# Patient Record
Sex: MALE | Race: WHITE
[De-identification: names, ages, dates, MRNs, and addresses within clinical notes are randomized per-mention and may not be internally consistent; named-entity substitution may affect disease eponyms.]

---

## 2020-07-01 ENCOUNTER — HOSPITAL ENCOUNTER (OUTPATIENT)
Dept: HOSPITAL 72 - CAR | Age: 60
Discharge: HOME | End: 2020-07-01
Payer: MEDICARE

## 2020-07-01 DIAGNOSIS — G45.9: Primary | ICD-10-CM

## 2020-07-01 PROCEDURE — 93306 TTE W/DOPPLER COMPLETE: CPT

## 2020-07-16 ENCOUNTER — HOSPITAL ENCOUNTER (EMERGENCY)
Dept: HOSPITAL 72 - EMR | Age: 60
Discharge: HOME | End: 2020-07-16
Payer: MEDICAID

## 2020-07-16 VITALS — DIASTOLIC BLOOD PRESSURE: 83 MMHG | SYSTOLIC BLOOD PRESSURE: 125 MMHG

## 2020-07-16 VITALS — DIASTOLIC BLOOD PRESSURE: 81 MMHG | SYSTOLIC BLOOD PRESSURE: 122 MMHG

## 2020-07-16 VITALS — BODY MASS INDEX: 31.83 KG/M2 | HEIGHT: 68 IN | WEIGHT: 210 LBS
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
## 2020-07-16 DIAGNOSIS — S52.502A: Primary | ICD-10-CM

## 2020-07-16 DIAGNOSIS — I10: ICD-10-CM

## 2020-07-16 DIAGNOSIS — Z88.5: ICD-10-CM

## 2020-07-16 DIAGNOSIS — Z88.0: ICD-10-CM
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
## 2020-07-16 DIAGNOSIS — S52.612A: ICD-10-CM
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
## 2020-07-16 DIAGNOSIS — Z88.1: ICD-10-CM

## 2020-07-16 DIAGNOSIS — Z86.73: ICD-10-CM

## 2020-07-16 PROCEDURE — 29125 APPL SHORT ARM SPLINT STATIC: CPT

## 2020-07-16 PROCEDURE — 99283 EMERGENCY DEPT VISIT LOW MDM: CPT

## 2020-07-16 NOTE — NUR
ED Nurse Note:pt. came with left wrist injury and pain s/p fall, pt. is 
ambulatory, VSS, x-ray was done

## 2020-07-16 NOTE — EMERGENCY ROOM REPORT
History of Present Illness


General


Chief Complaint:  Upper Extremity Injury


Source:  Patient





Present Illness


HPI


Disclaimer: Please note that this report is being documented using DRAGON 

technology. This can lead to erroneous entry secondary to incorrect 

interpretation by the dictating instrument.





HPI: 59-year-old male history of hypertension and stroke presented with left 

wrist pain.  He sustained a fall yesterday while walking.  During his left 

wrist.  Complains of left wrist pain that is 9 out of 10 worse with movement 

and palpation.  He denies any other injuries.





PMH: Hypertension, stroke


 


PSH: Reviewed


 


 Social Hx: Patient denies smoking drinking or illicit drug use


Allergies:  


Coded Allergies:  


     AMOXICILLIN (Verified  Allergy, Unknown, 4/4/09)


     PENICILLINS (Verified  Allergy, Rash, 9/30/12)


     MEPERIDINE (Verified  Adverse Reaction, Mild, NAUSEA, 5/4/11)


     MORPHINE (Verified  Adverse Reaction, Mild, NAUSEA, 5/4/11)





COVID-19 Screening


Contact w/high risk pt:  No


Recent Travel to affected area:  No


Experienced COVID-19 symptoms?:  No


COVID-19 Testing performed PTA:  Yes


COVID-19 Screening:  Negative COVID-19


COVID-19 Testing Source:  oropharynx





Patient History


Reviewed Nursing Documentation:  PMH: Agreed; PSxH: Agreed





Nursing Documentation-PMH


Past Medical History:  No History, Except For


Hx Hypertension:  Yes


Hx Cancer:  No


Hx Gastrointestinal Problems:  Yes - colltis


Hx Neurological Problems:  Yes


Hx Cerebrovascular Accident:  Yes - MILD STROKE X 2 YEAR 2009





Review of Systems


All Other Systems:  negative except mentioned in HPI





Physical Exam





Vital Signs








  Date Time  Temp Pulse Resp B/P (MAP) Pulse Ox O2 Delivery O2 Flow Rate FiO2


 


7/16/20 16:30 99.9 114 20 125/83 (97) 96 Room Air  








Sp02 EP Interpretation:  reviewed, normal


General Appearance:  well appearing, no apparent distress


Head:  normocephalic, atraumatic


Eyes:  bilateral eye PERRL, bilateral eye EOMI


ENT:  hearing grossly normal, moist mucus membranes


Neck:  full range of motion, supple


Respiratory:  lungs clear, normal breath sounds, no rhonchi, no respiratory 

distress, no retraction, no wheezing


Cardiovascular #1:  normal peripheral pulses, regular rate, rhythm, no murmur


Gastrointestinal:  non tender, soft, non-distended, no guarding


Musculoskeletal:  other - Left wrist tender to palpation swelling noted, no 

obvious deformity but bruising noted as well.  Distal sensation and pulses 

intact.


Neurologic:  alert, oriented x3, no focal defects


Skin:  normal color, warm/dry





Medical Decision Making


ER Course


MDM: Differential diagnosis included but not limited to left wrist sprain, 

contusion, fracture to name a few.





Clinical course-x-ray ordered of the left wrist and showed distal radius 

fracture, comminuted, patient placed in a splint.  Will be discharged home with 

orthopedic follow-up, primary care follow-up, and return precautions.


Other X-Ray Diagnostic Results


Other X-Ray Diagnostic Results :  


   # of Views/Limited Vs Complete:  3 View


   Indication:  Pain


   EP Interpretation:  Yes


   Interpretation:  other - Comminuted distal radius fracture


   Electronically Signed by:  Rory Pérez MD





Last Vital Signs








  Date Time  Temp Pulse Resp B/P (MAP) Pulse Ox O2 Delivery O2 Flow Rate FiO2


 


7/16/20 16:30 99.9 114 20 125/83 (97) 96 Room Air  








Status:  improved


Disposition:  HOME, SELF-CARE


Condition:  Stable


Scripts


Ibuprofen* (MOTRIN*) 600 Mg Tablet


600 MG ORAL Q6H PRN for For Pain, #30 TAB 0 Refills


   Prov: Rory Pérez M.D.         7/16/20











Rory Pérez M.D. Jul 16, 2020 17:25

## 2020-07-17 NOTE — DIAGNOSTIC IMAGING REPORT
Indication: Wrist pain status post injury

 

Technique: 3 views of the left wrist

 

Comparison: None

 

Findings: There is an acute, impacted and comminuted fracture of the distal radius.

Fracture lines involve the articular surface. Additionally there is an acute fracture

of the ulnar styloid. A well corticated ossific density projects dorsal to the carpal

bones on the lateral view which may represent sequela of chronic triquetral fracture.

There is soft tissue swelling. No radiopaque foreign body.

 

IMPRESSION: Acute fractures of the distal radius and ulna as above.

## 2020-08-01 ENCOUNTER — HOSPITAL ENCOUNTER (EMERGENCY)
Dept: HOSPITAL 72 - EMR | Age: 60
Discharge: HOME | End: 2020-08-01
Payer: MEDICARE

## 2020-08-01 VITALS — DIASTOLIC BLOOD PRESSURE: 62 MMHG | SYSTOLIC BLOOD PRESSURE: 112 MMHG

## 2020-08-01 VITALS — HEIGHT: 68 IN | WEIGHT: 205 LBS | BODY MASS INDEX: 31.07 KG/M2

## 2020-08-01 VITALS — DIASTOLIC BLOOD PRESSURE: 65 MMHG | SYSTOLIC BLOOD PRESSURE: 115 MMHG

## 2020-08-01 VITALS — DIASTOLIC BLOOD PRESSURE: 64 MMHG | SYSTOLIC BLOOD PRESSURE: 115 MMHG

## 2020-08-01 DIAGNOSIS — Z86.73: ICD-10-CM

## 2020-08-01 DIAGNOSIS — Z88.6: ICD-10-CM
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
## 2020-08-01 DIAGNOSIS — S99.911A: ICD-10-CM

## 2020-08-01 DIAGNOSIS — Z88.0: ICD-10-CM

## 2020-08-01 DIAGNOSIS — S99.921A: Primary | ICD-10-CM

## 2020-08-01 DIAGNOSIS — I10: ICD-10-CM

## 2020-08-01 PROCEDURE — 29515 APPLICATION SHORT LEG SPLINT: CPT

## 2020-08-01 PROCEDURE — 99284 EMERGENCY DEPT VISIT MOD MDM: CPT

## 2020-08-01 NOTE — DIAGNOSTIC IMAGING REPORT
EXAM:

  XR Right Ankle, 3 Views

 

CLINICAL HISTORY:

  PAIN

 

TECHNIQUE:

  Frontal, lateral and oblique views of the right ankle.

 

COMPARISON:

  Right foot x-rays obtained the same date.

 

FINDINGS:

  Bones/joints:  Unremarkable.  No visible displaced ankle fracture or 

dislocation.  Ankle mortise and talar dome appear unremarkable.  

Visualized joint spaces appear unremarkable.

  Soft tissues:  Mild soft tissue swelling overlying the lateral 

malleolus and anterior ankle.  No radiodense foreign bodies.  No soft 

tissue gas lucencies.

 

IMPRESSION:     

  Mild soft tissue swelling overlying the lateral malleolus and anterior 

ankle.

## 2020-08-01 NOTE — EMERGENCY ROOM REPORT
History of Present Illness


General


Chief Complaint:  Syncope


Source:  Patient, Medical Record





Present Illness


HPI


59-year-old male presents complaining of right foot and ankle pain.  States 

that on 7/16 he had a mechanical fall where he did in fact break his left 

forearm.  States that from that same fall he may have injured his right foot 

and ankle but did not seek medical attention at that time.  Pain is throbbing, 

8 out of 10, nonradiating.  Is able to bear weight.  No other aggravating 

relieving factors.  Denies any other associated symptoms


Allergies:  


Coded Allergies:  


     AMOXICILLIN (Verified  Allergy, Unknown, 4/4/09)


     PENICILLINS (Verified  Allergy, Rash, 9/30/12)


     MEPERIDINE (Verified  Adverse Reaction, Mild, NAUSEA, 5/4/11)


     MORPHINE (Verified  Adverse Reaction, Mild, NAUSEA, 5/4/11)





COVID-19 Screening


Contact w/high risk pt:  No


Recent Travel to affected area:  No


Experienced COVID-19 symptoms?:  No


COVID-19 Testing performed PTA:  Yes


COVID-19 Screening:  Negative COVID-19


COVID-19 Testing Source:  unknown





Patient History


Past Medical History:  HTN, CVA/TIA, other - colitis


Pertinent Family History:  none


Social History:  Denies: smoking, alcohol use, drug use


Immunizations:  UTD


Reviewed Nursing Documentation:  PMH: Agreed; PSxH: Agreed





Nursing Documentation-PMH


Past Medical History:  No History, Except For


Hx Hypertension:  Yes


Hx Cancer:  No


Hx Gastrointestinal Problems:  Yes - colltis


Hx Neurological Problems:  Yes


Hx Cerebrovascular Accident:  Yes - MILD STROKE X 2 YEAR 2009





Review of Systems


All Other Systems:  negative except mentioned in HPI





Physical Exam





Vital Signs








  Date Time  Temp Pulse Resp B/P (MAP) Pulse Ox O2 Delivery O2 Flow Rate FiO2


 


8/1/20 08:40 96.6 120 18 112/62 (79) 98 Room Air  








Sp02 EP Interpretation:  reviewed, normal


General Appearance:  no apparent distress, alert, GCS 15, non-toxic


Head:  normocephalic


Eyes:  bilateral eye normal inspection, bilateral eye PERRL


ENT:  normal ENT inspection


Neck:  normal inspection


Respiratory:  normal inspection


Cardiovascular #1:  normal inspection


Gastrointestinal:  normal inspection


Rectal:  deferred


Genitourinary:  no CVA tenderness


Musculoskeletal:  tender - R foot dorsum


Neurologic:  alert, motor strength/tone normal, oriented x3, sensory intact, 

responsive, speech normal


Psychiatric:  normal inspection


Skin:  no rash


Lymphatic:  normal inspection





Procedures


Splinting


Splinting :  


   Consent:  Verbal


   Pre-Made Type:  cast shoe/ace wrap


   Pre-Proc Neuro Vasc Exam:  normal


   Post-Proc Neuro Vasc Exam:  normal


   Patient Tolerated:  Well


   Complications:  None





Medical Decision Making


Diagnostic Impression:  


 Primary Impression:  


 Foot injury


 Qualified Codes:  S99.921A - Unspecified injury of right foot, initial 

encounter


 Additional Impression:  


 Ankle injury


 Qualified Codes:  S99.911A - Unspecified injury of right ankle, initial 

encounter


ER Course


Hospital Course 


60 yo M presents c/o R foot and ankle pain from fall on 7/16





Differential diagnoses include: Fracture, dislocation, sprain, contusion





Clinical course


Patient placed on stretcher.  After initial history and physical, I ordered 

Xrays of R foot/ankle 





Xrays read shows no acute fracture/dislocation.  placed in ace wrap, cast shoe





I discussed findings with patient.  Safe for discharge with close outpatient 

follow-up.  I will provide Ortho referral





Diagnosis - foot injury, ankle injury





Stable and discharged to home.  apply ice, keep elevated.  weight bear as 

tolerated.  Followup with PMD/ortho.  Return to ED if symptoms recur or worsen


Other X-Ray Diagnostic Results


Other X-Ray Diagnostic Results #1:  


   X-Ray ordered:  R foot


   # of Views/Limited Vs Complete:  3 View


   Indication:  Pain


   EP Interpretation:  Yes


   Interpretation:  no dislocation, no soft tissue swelling, no fractures


   Impression:  No acute disease


   Electronically Signed by:  Electronically signed by Dawson Leong MD


Other X-Ray Diagnostic Results #2:  


   X-Ray ordered:  R ankle


   # of Views/Limited Vs Complete:  3 View


   Indication:  Pain


   EP Interpretation:  Yes


   Interpretation:  no dislocation, no soft tissue swelling, no fractures


   Impression:  No acute disease


   Electronically Signed by:  Electronically signed by Dawson Leong MD





Last Vital Signs








  Date Time  Temp Pulse Resp B/P (MAP) Pulse Ox O2 Delivery O2 Flow Rate FiO2


 


8/1/20 09:00 96.6 78 18 112/62 98 Room Air  








Status:  improved


Disposition:  HOME, SELF-CARE


Condition:  Stable


Referrals:  


NOT CHOSEN [facility]/MD,REFERRING (PCP)











[facility]





[facility]  **Open 24 hour /7 days a week by Appointment Only**





[address] [address]


Patient Instructions:  Contusion, Easy-to-Read











Dawson Leong MD Aug 1, 2020 12:03

## 2020-08-01 NOTE — NUR
ED Nurse Note:



Patient walked into ED with walker, advised to come in by his PC Dr. Camacho after 
a fall 3 days ago and injuring his right knee and ankle. Patient states he fell 
after turning his head, denies complete LOC. Patient  AxO x 4, no s/s of acute 
distress. Patient placed on the cardiac monitor.

## 2020-08-01 NOTE — DIAGNOSTIC IMAGING REPORT
EXAM:

  XR right foot, 3 views

 

CLINICAL HISTORY:

  PAIN

 

TECHNIQUE:

  Frontal, lateral, and oblique views of the right foot.

 

COMPARISON:

  Right ankle x-rays obtained the same date

 

FINDINGS:

  Bones/joints:  2 separate tiny ossific bodies lateral to the base of 

fifth metatarsal, largest measuring 4 mm.  Otherwise no evidence of 

fracture or dislocation in the foot.  Joint spaces appear within normal 

limits.

  Soft tissues:  Soft tissue swelling overlying the forefoot.  No 

radiodense foreign bodies.  No soft tissue gas lucencies.

 

IMPRESSION:     

1.  Soft tissue swelling overlying the forefoot.

2.  Two separate tiny ossific bodies lateral to the base of fifth 

metatarsal, largest measuring 4 mm.  These are likely small accessory 

ossicles or possibly sequelae of remote trauma.  Recommend correlation 

with point tenderness along the lateral mid foot/forefoot to exclude 

subtle avulsion injury.

## 2020-08-13 ENCOUNTER — HOSPITAL ENCOUNTER (INPATIENT)
Dept: HOSPITAL 72 - EMR | Age: 60
LOS: 5 days | Discharge: NURSING, REHAB OR LONG-TERM CARE | DRG: 91 | End: 2020-08-18
Payer: MEDICARE

## 2020-08-13 VITALS — SYSTOLIC BLOOD PRESSURE: 116 MMHG | DIASTOLIC BLOOD PRESSURE: 76 MMHG

## 2020-08-13 VITALS — BODY MASS INDEX: 28.63 KG/M2 | HEIGHT: 70 IN | WEIGHT: 200 LBS

## 2020-08-13 VITALS — SYSTOLIC BLOOD PRESSURE: 118 MMHG | DIASTOLIC BLOOD PRESSURE: 88 MMHG

## 2020-08-13 VITALS — DIASTOLIC BLOOD PRESSURE: 78 MMHG | SYSTOLIC BLOOD PRESSURE: 117 MMHG

## 2020-08-13 DIAGNOSIS — Z88.1: ICD-10-CM

## 2020-08-13 DIAGNOSIS — E53.8: ICD-10-CM

## 2020-08-13 DIAGNOSIS — Z86.73: ICD-10-CM
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
## 2020-08-13 DIAGNOSIS — G93.89: ICD-10-CM

## 2020-08-13 DIAGNOSIS — F41.9: ICD-10-CM

## 2020-08-13 DIAGNOSIS — F10.11: ICD-10-CM

## 2020-08-13 DIAGNOSIS — G92: ICD-10-CM

## 2020-08-13 DIAGNOSIS — F15.11: ICD-10-CM

## 2020-08-13 DIAGNOSIS — Z98.1: ICD-10-CM

## 2020-08-13 DIAGNOSIS — R27.0: Primary | ICD-10-CM

## 2020-08-13 DIAGNOSIS — Z88.0: ICD-10-CM

## 2020-08-13 DIAGNOSIS — Z88.8: ICD-10-CM

## 2020-08-13 DIAGNOSIS — S09.90XA: ICD-10-CM

## 2020-08-13 DIAGNOSIS — R29.6: ICD-10-CM

## 2020-08-13 DIAGNOSIS — D80.3: ICD-10-CM

## 2020-08-13 LAB
ADD MANUAL DIFF: NO
ALBUMIN SERPL-MCNC: 3.3 G/DL (ref 3.4–5)
ALBUMIN/GLOB SERPL: 0.7 {RATIO} (ref 1–2.7)
ALP SERPL-CCNC: 140 U/L (ref 46–116)
ALT SERPL-CCNC: 11 U/L (ref 12–78)
ANION GAP SERPL CALC-SCNC: 5 MMOL/L (ref 5–15)
APPEARANCE UR: CLEAR
APTT BLD: 35 SEC (ref 23–33)
APTT PPP: YELLOW S
AST SERPL-CCNC: 17 U/L (ref 15–37)
BASOPHILS NFR BLD AUTO: 0.8 % (ref 0–2)
BILIRUB SERPL-MCNC: 0.3 MG/DL (ref 0.2–1)
BUN SERPL-MCNC: 16 MG/DL (ref 7–18)
CALCIUM SERPL-MCNC: 9 MG/DL (ref 8.5–10.1)
CHLORIDE SERPL-SCNC: 99 MMOL/L (ref 98–107)
CO2 SERPL-SCNC: 31 MMOL/L (ref 21–32)
CREAT SERPL-MCNC: 1.2 MG/DL (ref 0.55–1.3)
EOSINOPHIL NFR BLD AUTO: 2.5 % (ref 0–3)
ERYTHROCYTE [DISTWIDTH] IN BLOOD BY AUTOMATED COUNT: 12.2 % (ref 11.6–14.8)
GLOBULIN SER-MCNC: 4.8 G/DL
GLUCOSE UR STRIP-MCNC: NEGATIVE MG/DL
HCT VFR BLD CALC: 35.1 % (ref 42–52)
HGB BLD-MCNC: 12.3 G/DL (ref 14.2–18)
INR PPP: 1.1 (ref 0.9–1.1)
KETONES UR QL STRIP: NEGATIVE
LEUKOCYTE ESTERASE UR QL STRIP: (no result)
LYMPHOCYTES NFR BLD AUTO: 24.5 % (ref 20–45)
MCV RBC AUTO: 91 FL (ref 80–99)
MONOCYTES NFR BLD AUTO: 6.6 % (ref 1–10)
NEUTROPHILS NFR BLD AUTO: 65.7 % (ref 45–75)
NITRITE UR QL STRIP: NEGATIVE
PH UR STRIP: 7 [PH] (ref 4.5–8)
PLATELET # BLD: 142 K/UL (ref 150–450)
POTASSIUM SERPL-SCNC: 4.1 MMOL/L (ref 3.5–5.1)
PROT UR QL STRIP: NEGATIVE
RBC # BLD AUTO: 3.87 M/UL (ref 4.7–6.1)
SODIUM SERPL-SCNC: 135 MMOL/L (ref 136–145)
SP GR UR STRIP: 1.01 (ref 1–1.03)
UROBILINOGEN UR-MCNC: 8 MG/DL (ref 0–1)
WBC # BLD AUTO: 5.4 K/UL (ref 4.8–10.8)

## 2020-08-13 PROCEDURE — 84443 ASSAY THYROID STIM HORMONE: CPT

## 2020-08-13 PROCEDURE — 80048 BASIC METABOLIC PNL TOTAL CA: CPT

## 2020-08-13 PROCEDURE — 36415 COLL VENOUS BLD VENIPUNCTURE: CPT

## 2020-08-13 PROCEDURE — 70551 MRI BRAIN STEM W/O DYE: CPT

## 2020-08-13 PROCEDURE — 80053 COMPREHEN METABOLIC PANEL: CPT

## 2020-08-13 PROCEDURE — 95819 EEG AWAKE AND ASLEEP: CPT

## 2020-08-13 PROCEDURE — 85610 PROTHROMBIN TIME: CPT

## 2020-08-13 PROCEDURE — 82803 BLOOD GASES ANY COMBINATION: CPT

## 2020-08-13 PROCEDURE — 82306 VITAMIN D 25 HYDROXY: CPT

## 2020-08-13 PROCEDURE — 84484 ASSAY OF TROPONIN QUANT: CPT

## 2020-08-13 PROCEDURE — 85730 THROMBOPLASTIN TIME PARTIAL: CPT

## 2020-08-13 PROCEDURE — 70450 CT HEAD/BRAIN W/O DYE: CPT

## 2020-08-13 PROCEDURE — 36600 WITHDRAWAL OF ARTERIAL BLOOD: CPT

## 2020-08-13 PROCEDURE — 82746 ASSAY OF FOLIC ACID SERUM: CPT

## 2020-08-13 PROCEDURE — 93005 ELECTROCARDIOGRAM TRACING: CPT

## 2020-08-13 PROCEDURE — 82607 VITAMIN B-12: CPT

## 2020-08-13 PROCEDURE — 86592 SYPHILIS TEST NON-TREP QUAL: CPT

## 2020-08-13 PROCEDURE — 80307 DRUG TEST PRSMV CHEM ANLYZR: CPT

## 2020-08-13 PROCEDURE — 96375 TX/PRO/DX INJ NEW DRUG ADDON: CPT

## 2020-08-13 PROCEDURE — 81003 URINALYSIS AUTO W/O SCOPE: CPT

## 2020-08-13 PROCEDURE — 99285 EMERGENCY DEPT VISIT HI MDM: CPT

## 2020-08-13 PROCEDURE — 85025 COMPLETE CBC W/AUTO DIFF WBC: CPT

## 2020-08-13 PROCEDURE — 96365 THER/PROPH/DIAG IV INF INIT: CPT

## 2020-08-13 PROCEDURE — 82140 ASSAY OF AMMONIA: CPT

## 2020-08-13 PROCEDURE — 82962 GLUCOSE BLOOD TEST: CPT

## 2020-08-13 PROCEDURE — 73521 X-RAY EXAM HIPS BI 2 VIEWS: CPT

## 2020-08-13 PROCEDURE — 72141 MRI NECK SPINE W/O DYE: CPT

## 2020-08-13 RX ADMIN — OXYCODONE HYDROCHLORIDE SCH MG: 20 TABLET, FILM COATED, EXTENDED RELEASE ORAL at 21:35

## 2020-08-13 RX ADMIN — DOCUSATE SODIUM SCH MG: 100 CAPSULE, LIQUID FILLED ORAL at 21:35

## 2020-08-13 RX ADMIN — HEPARIN SODIUM SCH UNITS: 5000 INJECTION INTRAVENOUS; SUBCUTANEOUS at 21:36

## 2020-08-13 RX ADMIN — LORAZEPAM SCH MG: 1 TABLET ORAL at 21:35

## 2020-08-13 NOTE — DIAGNOSTIC IMAGING REPORT
Indication: Head trauma, status post fall, headaches

 

Technique: sagittal T1 fast spin echo, axial T1 FLAIR, axial T2 FLAIR, axial T2 FS

PROPELLER, axial T2* GRE, axial diffusion weighted images. ADC and exponential ADC

maps generated

 

Comparison: Head CT dated 8/3/2020, brain MRI dated 6/26/2020 and 2/5/2018

 

Findings: There is an area of encephalomalacia in the right parasagittal frontal

lobe. This is also evident on the previous exams. Asymmetric widening of the left

sylvian fissure may indicate old left temporal encephalomalacia, also described

previously.  No abnormal areas of restricted diffusion to suggest acute infarction.

No acute hemorrhage or edema. No mass effect nor midline shift. There is mild

age-related enlargement of the ventricles and extra axial CSF spaces. Old lacunar

infarct is seen in the right cerebellum. There is right maxillary sinus mucosal

disease. Visualized orbits are unremarkable.. Visualized orbits and sinuses are

unremarkable.

 

Impression: Chronic and age-related changes, including old infarcts

 

Negative for acute intracranial bleed or mass effect

## 2020-08-13 NOTE — DIAGNOSTIC IMAGING REPORT
Indication: Weakness and neck pain 

 

Technique: Sagittal T1 FLAIR PROPELLER, sagittal T2 PROPELLOR, sagittal STIR, axial

T2 PROPELLER, axial 3D COSMIC ASPIR images were obtained through the cervical spine

 

Comparison: 8/7/2017

 

Findings: Exam is limited due to motion artifact. Bony alignment is normal. Vertebral

marrow signal is normal. Again demonstrated is cervical fusion hardware bridging

C3-C7. This throws off some susceptibility artifact which may obscure pathology.

Intrinsic cord signal is normal. Vertebral body heights are preserved. No

prevertebral soft tissue swelling.

 

At C2-3, there is mild broad-based posterior disc protrusion which results in

borderline narrowing of the spinal canal. There is probably mild narrowing of the

left neural foramen at this level, although this is not well visualized due to the

motion artifact.

 

At C3-4, minimal posterior disc bulge/osteophyte complex is noted, does not appear to

significantly compromise the spinal canal. There may be mild neural foraminal

narrowing on the left.

 

At C4-5, there is minimal right paracentral disc protrusion which may slightly

compromise the spinal canal. The neural foramina are preserved.

 

At C5-6, no significant disc bulge or protrusion, spinal stenosis, or neural

foraminal narrowing is demonstrated.

 

At C6-7, there is questionable right lateral osteophyte complex which could slightly

compromise right lateral recess. No definite central canal stenosis. There is

questionable mild narrowing of the left neural foramen.

 

At C7-T1, no significant disc bulge or protrusion, spinal stenosis, or neural

foraminal stenosis..

 

Included extraspinal soft tissues are unremarkable.

 

Impression: Limited exam, due to motion artifact. This limits assessment especially

of the spinal canal and the neural foramina

 

Exam also somewhat limited due to susceptibility artifact from hardware

 

Mild degenerative changes, as detailed on a level by level basis above.

## 2020-08-13 NOTE — EMERGENCY ROOM REPORT
History of Present Illness


General


Chief Complaint:  Generalized Weakness


Source:  Patient, EMS





Present Illness


HPI


Patient is a 59-year-old male who presents after increased headache and 

generalized weakness.  Reports having multiple falls recently.  Had prior 

history of recent fall with head injury.  Previous history of cervical surgery 

for myelitis.  Reports having increased difficulty with ambulation and more 

frequent falling.  Reports having fallen this morning.  Struck the back of his 

head.  Denies prior history of seizure disorder.  States he is followed by Dr. Sp Hess.Patient reports having increased pain to his head and neck.  Had 

recent CT imaging which did not show any evidence of acute intracranial 

hemorrhage.


Allergies:  


Coded Allergies:  


     AMOXICILLIN (Verified  Allergy, Unknown, 4/4/09)


     PENICILLINS (Verified  Allergy, Unknown, Rash, 8/13/20)


     MEPERIDINE (Verified  Adverse Reaction, Mild, NAUSEA, 5/4/11)


     MORPHINE (Verified  Adverse Reaction, Mild, NAUSEA, 5/4/11)


Uncoded Allergies:  


     PENICILIN (Allergy, Unknown, 8/13/20)





COVID-19 Screening


Contact w/high risk pt:  No


Recent Travel to affected area:  No


Experienced COVID-19 symptoms?:  No


COVID-19 Testing performed PTA:  No





Patient History


Past Medical History:  see triage record


Past Surgical History:  other - C-spine surgery


Reviewed Nursing Documentation:  PMH: Agreed; PSxH: Agreed





Nursing Documentation-PMH


Hx Hypertension:  Yes


Hx Cancer:  No


Hx Gastrointestinal Problems:  Yes - colitis


Hx Neurological Problems:  Yes


Hx Cerebrovascular Accident:  Yes - MILD STROKE X 2 YEAR 2009





Review of Systems


All Other Systems:  negative except mentioned in HPI





Physical Exam





Vital Signs








  Date Time  Temp Pulse Resp B/P (MAP) Pulse Ox O2 Delivery O2 Flow Rate FiO2


 


8/13/20 14:28 98.1 101 16 121/80 (94) 99 Room Air  








General Appearance:  alert, GCS 15, Chronically Ill


Head:  other - Occipital scalp laceration less than 1 cm with no active bleeding


ENT:  hearing grossly normal


Neck:  limited range of motion


Respiratory:  chest non-tender, lungs clear, normal breath sounds


Cardiovascular #1:  normal inspection, no edema


Gastrointestinal:  normal inspection, soft


Musculoskeletal:  normal inspection, back normal


Neurologic:  alert, motor strength/tone normal, CNs III-XII nml as tested





Medical Decision Making


Diagnostic Impression:  


 Primary Impression:  


 Head injury


 Additional Impressions:  


 Weakness


 Cervical spine disease


 Laceration of head


ER Course


Patient presented after recent fall.  Differential diagnosis include was not 

limited to head injury, subdural hematoma, degenerative neurologic condition, 

alcohol withdrawal, among others.  Because of complexity of patient's case 

laboratory tests and imaging studies were ordered.  Patient was noted to have 

some generalized confusion and states he takes normally gabapentin as well as 

other pain medications.  Patient had previous CT imaging approximately 1 week 

ago.  This showed no evidence of acute intracranial pathology.  Patient had 

recurrent head trauma and had previously had MRIs attempted 2 months ago which 

was markedly degraded by metal artifact.Patient staples to the left side of his 

head were removed.  MRI read by radiology showed encephalomalacia to the 

frontal lobe without evident intracranial hemorrhage or mass.  MRI of the 

cervical spine showed, multilevel degenerative changes.  Patient was discussed 

with Dr. Rao who is covering for [employer] patient will be 

admitted to [facility] due to covering physician for Dr. Chuck Hess who is the patient's primary care physician..





Labs








Test


  8/13/20


15:15 8/13/20


16:57


 


White Blood Count


  5.4 K/UL


(4.8-10.8) 


 


 


Red Blood Count


  3.87 M/UL


(4.70-6.10) 


 


 


Hemoglobin


  12.3 G/DL


(14.2-18.0) 


 


 


Hematocrit


  35.1 %


(42.0-52.0) 


 


 


Mean Corpuscular Volume 91 FL (80-99)  


 


Mean Corpuscular Hemoglobin


  31.9 PG


(27.0-31.0) 


 


 


Mean Corpuscular Hemoglobin


Concent 35.2 G/DL


(32.0-36.0) 


 


 


Red Cell Distribution Width


  12.2 %


(11.6-14.8) 


 


 


Platelet Count


  142 K/UL


(150-450) 


 


 


Mean Platelet Volume


  7.6 FL


(6.5-10.1) 


 


 


Neutrophils (%) (Auto)


  65.7 %


(45.0-75.0) 


 


 


Lymphocytes (%) (Auto)


  24.5 %


(20.0-45.0) 


 


 


Monocytes (%) (Auto)


  6.6 %


(1.0-10.0) 


 


 


Eosinophils (%) (Auto)


  2.5 %


(0.0-3.0) 


 


 


Basophils (%) (Auto)


  0.8 %


(0.0-2.0) 


 


 


Sodium Level


  135 MMOL/L


(136-145) 


 


 


Potassium Level


  4.1 MMOL/L


(3.5-5.1) 


 


 


Chloride Level


  99 MMOL/L


() 


 


 


Carbon Dioxide Level


  31 MMOL/L


(21-32) 


 


 


Anion Gap


  5 mmol/L


(5-15) 


 


 


Blood Urea Nitrogen


  16 mg/dL


(7-18) 


 


 


Creatinine


  1.2 MG/DL


(0.55-1.30) 


 


 


Estimat Glomerular Filtration


Rate > 60 mL/min


(>60) 


 


 


Glucose Level


  101 MG/DL


() 


 


 


Calcium Level


  9.0 MG/DL


(8.5-10.1) 


 


 


Total Bilirubin


  0.3 MG/DL


(0.2-1.0) 


 


 


Aspartate Amino Transf


(AST/SGOT) 17 U/L (15-37) 


  


 


 


Alanine Aminotransferase


(ALT/SGPT) 11 U/L (12-78) 


  


 


 


Alkaline Phosphatase


  140 U/L


() 


 


 


Troponin I


  0.000 ng/mL


(0.000-0.056) 


 


 


Total Protein


  8.1 G/DL


(6.4-8.2) 


 


 


Albumin


  3.3 G/DL


(3.4-5.0) 


 


 


Globulin 4.8 g/dL  


 


Albumin/Globulin Ratio 0.7 (1.0-2.7)  


 


Thyroid Stimulating Hormone


(TSH) 2.515 uiU/mL


(0.358-3.740) 


 











Last Vital Signs








  Date Time  Temp Pulse Resp B/P (MAP) Pulse Ox O2 Delivery O2 Flow Rate FiO2


 


8/13/20 14:28 98.1 101 16 121/80 (94) 99 Room Air  








Status:  improved


Disposition:  ADMITTED AS INPATIENT


Condition:  Stable











Toy Leyva MD Aug 13, 2020 14:44

## 2020-08-14 VITALS — DIASTOLIC BLOOD PRESSURE: 78 MMHG | SYSTOLIC BLOOD PRESSURE: 128 MMHG

## 2020-08-14 VITALS — DIASTOLIC BLOOD PRESSURE: 83 MMHG | SYSTOLIC BLOOD PRESSURE: 150 MMHG

## 2020-08-14 VITALS — SYSTOLIC BLOOD PRESSURE: 115 MMHG | DIASTOLIC BLOOD PRESSURE: 69 MMHG

## 2020-08-14 VITALS — DIASTOLIC BLOOD PRESSURE: 81 MMHG | SYSTOLIC BLOOD PRESSURE: 130 MMHG

## 2020-08-14 VITALS — SYSTOLIC BLOOD PRESSURE: 118 MMHG | DIASTOLIC BLOOD PRESSURE: 65 MMHG

## 2020-08-14 VITALS — SYSTOLIC BLOOD PRESSURE: 119 MMHG | DIASTOLIC BLOOD PRESSURE: 77 MMHG

## 2020-08-14 LAB
ADD MANUAL DIFF: NO
ANION GAP SERPL CALC-SCNC: 6 MMOL/L (ref 5–15)
BASOPHILS NFR BLD AUTO: 0.7 % (ref 0–2)
BUN SERPL-MCNC: 17 MG/DL (ref 7–18)
CALCIUM SERPL-MCNC: 9 MG/DL (ref 8.5–10.1)
CHLORIDE SERPL-SCNC: 101 MMOL/L (ref 98–107)
CO2 SERPL-SCNC: 30 MMOL/L (ref 21–32)
CREAT SERPL-MCNC: 1.2 MG/DL (ref 0.55–1.3)
EOSINOPHIL NFR BLD AUTO: 3.2 % (ref 0–3)
ERYTHROCYTE [DISTWIDTH] IN BLOOD BY AUTOMATED COUNT: 11.9 % (ref 11.6–14.8)
HCT VFR BLD CALC: 37.4 % (ref 42–52)
HGB BLD-MCNC: 12.4 G/DL (ref 14.2–18)
LYMPHOCYTES NFR BLD AUTO: 31.9 % (ref 20–45)
MCV RBC AUTO: 93 FL (ref 80–99)
MONOCYTES NFR BLD AUTO: 7.4 % (ref 1–10)
NEUTROPHILS NFR BLD AUTO: 56.7 % (ref 45–75)
PLATELET # BLD: 160 K/UL (ref 150–450)
POTASSIUM SERPL-SCNC: 4 MMOL/L (ref 3.5–5.1)
RBC # BLD AUTO: 4.03 M/UL (ref 4.7–6.1)
SODIUM SERPL-SCNC: 137 MMOL/L (ref 136–145)
WBC # BLD AUTO: 5.2 K/UL (ref 4.8–10.8)

## 2020-08-14 RX ADMIN — LORAZEPAM SCH MG: 1 TABLET ORAL at 21:22

## 2020-08-14 RX ADMIN — Medication SCH EA: at 08:43

## 2020-08-14 RX ADMIN — QUETIAPINE SCH MG: 200 TABLET, FILM COATED ORAL at 08:43

## 2020-08-14 RX ADMIN — OXYCODONE HYDROCHLORIDE SCH MG: 20 TABLET, FILM COATED, EXTENDED RELEASE ORAL at 17:00

## 2020-08-14 RX ADMIN — CYANOCOBALAMIN SCH MCG: 1000 INJECTION, SOLUTION INTRAMUSCULAR at 15:52

## 2020-08-14 RX ADMIN — DOCUSATE SODIUM SCH MG: 100 CAPSULE, LIQUID FILLED ORAL at 08:42

## 2020-08-14 RX ADMIN — DOCUSATE SODIUM SCH MG: 100 CAPSULE, LIQUID FILLED ORAL at 21:22

## 2020-08-14 RX ADMIN — OXYCODONE HYDROCHLORIDE SCH MG: 20 TABLET, FILM COATED, EXTENDED RELEASE ORAL at 23:49

## 2020-08-14 RX ADMIN — OXYCODONE HYDROCHLORIDE SCH MG: 20 TABLET, FILM COATED, EXTENDED RELEASE ORAL at 08:43

## 2020-08-14 RX ADMIN — HEPARIN SODIUM SCH UNITS: 5000 INJECTION INTRAVENOUS; SUBCUTANEOUS at 08:45

## 2020-08-14 RX ADMIN — HEPARIN SODIUM SCH UNITS: 5000 INJECTION INTRAVENOUS; SUBCUTANEOUS at 21:25

## 2020-08-14 NOTE — CONSULTATION
Consult Note


Consult Note


      [facility]


        NEUROLOGY CONSULTATION


              August 14, 2020





Dear Dr. Rao,





I evaluated Mr. Moore and my assesement is as follows.





HISTORY:


Mr. Sven Moore is a 59-year-old, right-handed, 


 gentleman, who was referred to me for 


evaluation and management of alteration in mental 


state, and unsteadiness on his feet with frequent falls.





He does have a past history of a cervical spine injury 


for which he has had surgery, a severe head injury the 


exact details of which he cannot tell me about, anxiety, 


polysubstance abuse, and unsteadiness on his feet.





For the last 3 weeks or so he has been increasingly 


unsteady on his feet, has fallen down on numerous 


occasions, and has been more confused and altered 


with regards to his mental state.





He himself is unable to give me much of a history.





PAST HISTORY:


Cervical spine injury for which he has had surgery, a 


severe head injury the exact details of which he cannot 


tell me about, anxiety, polysubstance abuse, and 


unsteadiness on his feet.





FAMILY HISTORY:


Nothing significant as per the patient.





PERSONAL HISTORY:


Home: He lives at home with his parents.


Work: He is unable to tell me what kind of work he does


right now and what kind of work he did in the past.


Habits: He tells me that he used to drink and use drugs 


in the past but not recently.  He tells me that he does 


not smoke.





ALLERGIES:


He is allergic to amoxicillin meperidine morphine and penicillins.





NEUROLOGIC REVIEW OF SYSTEMS:


Unable to obtain.





PHYSICAL EXAMINATION:


GENERAL: He is a well-developed, relatively well-nourished, 


 gentleman, lying in bed, in no acute distress, 


exhibiting significant waxing and waning of his level of 


alertness throughout the entire interview and examination.


VITAL SIGNS:


Pulse:  98.1/minute and regular.


Blood Pressure: 128/78 mm of Hg.


Respirations: 20/minute


Temperature 98.1 F


HEAD: Normocephalic and atraumatic. 


NECK: No neck rigidity was observed. 


Range of motion was decreased in the neck.


EENT: Benign. 





NEUROLOGIC EXAMINATION:  


MENTAL STATUS EXAMINATION: 


He exhibited significant waxing and waning of his level 


of alertness.  He would be awake and alert for a few 


seconds and then rapidly go back to sleep while being 


talked to.


He was oriented to self only.  He had no idea of where he


was or what the date was.


He was unable to recall 3 words given to him.


He was unable to tell me who the present president is and 


who prior presidents were.


He was unable to cooperate for further mental status testing.


SPEECH: He had a mild dysarthria.


LANGUAGE: He was unable to cooperate for formal language


testing.  He however was able to be comprehend simple 


commands but did have problems with expression.


CRANIAL NERVE EXAMINATION:  


II: He did blink to threat in all fields but had problems with


counting fingers.


III, IV, VI: External ocular movements were full and pupils 


3 mm in diameter equal, round, regular and reactive to light.  


V:  The facial sensations were normal, and the temporales, 


masseters and pterygoids functioned normally. 


VII: He had a mild left greater than right seventh central 


facial paresis.


VIII: The patient was able to hear well bilaterally and had 


no nystagmus. 


IX: The palate moved symmetrically on phonation. 


X:  There was no hoarseness of voice.  


XI:  The sternocleidomastoids and trapezii functioned normally.  


XII:  The tongue was in the midline without any fasciculations 


or atrophy.


MOTOR SYSTEM:  


The tone was increased in both lower extremities with mild 


degree of spasticity.


Examination of muscle mass revealed no focal wasting. 


Examination of power was exceedingly difficult to measure 


because of varying degrees of cooperation.  He did however 


have mild bilateral finger extensor and iliopsoas weakness.


SENSORY EXAMINATION: He responded appropriately to 


painful stimuli.  He was unable to cooperate for the 


sensory modalities.


REFLEXES:  1+ and bilaterally symmetric at the biceps, 


triceps, brachioradialis, and knees. 0 at both ankles.  


The plantar responses were flexor bilaterally.  


COORDINATION, STANCE & GAIT: Could not be tested.


ABNORMAL MOVEMENTS:


Asterixis: G 2/4 in both upper extremities.


Myoclonus: G 1/4





DIAGNOSTIC IMPRESSION:


1. Mr. Sven Moore is a 59-year-old, right-handed, 


 gentleman, who does have a past history of 


a cervical spine injury for which he has had surgery, 


a severe head injury the exact details of which he cannot 


tell me about, anxiety, polysubstance abuse, and 


unsteadiness on his feet.


2. For the last 3 weeks or so he has been increasingly 


unsteady on his feet, has fallen down on numerous 


occasions, and has been more confused and altered 


with regards to his mental state. He himself is unable 


to give me much of a history.


3.  On neurological examination, at this time, he exhibits 


significant waxing and waning of his level of alertness.


He has significant problems with orientation, recent and 


remote memory, and other cognitive function.  He has a 


mild dysarthria, and is unable to cooperate for formal 


language testing.  He has a left greater than right 


seventh central facial paresis.  He also has bilateral finger 


extensor and iliopsoas weakness associated with lower 


extremity spasticity.  His deep tendon reflexes are globally


diminished.  He is unable to stand and walk.  He exhibits 


significant asterixis and minor myoclonus.


4.  The MRI scan of the brain reveals bilateral frontal and 


right temporal encephalomalacia in a traumatic pattern.


5.  The MRI scan of the cervical spine reveals postsurgical 


changes with mild to moderate multilevel disease but no 


cord compression.


6.  Laboratory data obtained thus far have revealed a mild 


anemia with a hemoglobin of 12.4 g, elevated alkaline 


phosphatase 840, low albumin at 3.3, low vitamin B12 


level at 304, low folate at 6 and normal TSH.


7.  The patient's history and neurological examination are 


most consistent with an encephalopathic picture.  There 


is a high probability that this encephalopathy is of a 


metabolic nature.


8.  The frequent falls may be associated with the 


encephalopathy and asterixis.





RECOMMENDATIONS:


1.  Agree with management thus far.


2.  Correct all toxic metabolic imbalances including reversal 


of folate and B12 deficiency.


3.  Complete work-up for encephalopathy with vitamin D level 


ABG and ammonia.


4.  EEG to evaluate the patient for the degree and type of 


encephalopathy.


5.  Observe closely.





Thank you for entrusting me to take care of Mr. Moore's 


Neurologic needs.





I shall follow him with you.





Sincerely, 








__________________________________


Tio Martin M.D., M.S.P.H.


Neurologist & Clinical Neurophysiologist











Tio Martin MD Aug 14, 2020 14:27

## 2020-08-14 NOTE — HISTORY AND PHYSICAL
History of Present Illness


General


Date patient seen:  Aug 14, 2020


Time patient seen:  06:55


Reason for Hospitalization:  Generalized Weakness





Present Illness


HPI


59 year old man, remote history of alcohol abuse, methamphetamine use, CVA, 

previous cervical spine fusion, anxiety who presented to the ED with frequent 

falls, ataxia and bilateral leg weakness. No incontinence, focal weakness, no 

visual deficits. In ED imaging including MRI brain and C-spine were done, no 

acute abnormality found. Brain did show encephalomalacia.


Allergies:  


Coded Allergies:  


     AMOXICILLIN (Verified  Allergy, Unknown, 4/4/09)


     PENICILLINS (Verified  Allergy, Unknown, Rash, 8/13/20)


     MEPERIDINE (Verified  Adverse Reaction, Mild, NAUSEA, 5/4/11)


     MORPHINE (Verified  Adverse Reaction, Mild, NAUSEA, 5/4/11)


Uncoded Allergies:  


     PENICILIN (Allergy, Unknown, 8/13/20)





COVID-19 Screening


Contact w/high risk pt:  No


Recent Travel to affected area:  No


Experienced COVID-19 symptoms?:  No





Medication History


Scheduled


Gabapentin (Neurontin), 300 MG ORAL THREE TIMES A DAY, (Reported)


Lorazepam* (Ativan*), 2 MG ORAL BEDTIME, (Reported)


Multivitamin With Minerals (Multivitamins With Minerals*), 1 TAB ORAL DAILY, (

Reported)


Oxycodone Hcl Er* (Oxycontin*), 80 MG ORAL TID, (Reported)


Quetiapine Fumarate* (Seroquel*), 200 MG ORAL DAILY, (Reported)





Scheduled PRN


Ibuprofen* (Motrin*), 600 MG ORAL Q6H PRN for For Pain





Patient History


Healthcare decision maker





Resuscitation status





Advanced Directive on File








Review of Systems


Constitutional:  Denies: chills, fever


Respiratory:  Denies: cough


Cardiovascular:  Denies: chest pain


Gastrointestinal:  Denies: abdominal pain


Musculoskeletal:  Denies: back pain


Skin:  Denies: rash


Neurological:  Reports: focal weakness; Denies: headache





Physical Exam


General Appearance:  alert


HEENT:  atraumatic, anicteric


Neck:  normal alignment, supple


Respiratory/Chest:  lungs clear, normal breath sounds


Cardiovascular/Chest:  normal rate, regular rhythm


Abdomen:  non tender


Neurologic:  CNs II-XII grossly normal, other - Hyperreflexia





Last 24 Hour Vital Signs








  Date Time  Temp Pulse Resp B/P (MAP) Pulse Ox O2 Delivery O2 Flow Rate FiO2


 


8/14/20 04:00 97.5 76 20 118/65 (82) 95   


 


8/14/20 00:00 97.7 78 18 119/77 (91) 98   


 


8/13/20 22:16 96.1       


 


8/13/20 21:00      Room Air  


 


8/13/20 20:00 96.1 91 22 118/88 (98) 95   


 


8/13/20 19:05      Room Air  


 


8/13/20 18:25 98.2 74 17 124/76 98   


 


8/13/20 17:21 98.1       


 


8/13/20 17:04 98.1 95 17 116/76 96 Room Air  


 


8/13/20 15:00 98.1 98 18 117/78 98 Room Air  


 


8/13/20 15:00  98 18   Room Air  98


 


8/13/20 14:28 98.1 101 16 121/80 (94) 99 Room Air  

















Intake and Output  


 


 8/13/20 8/14/20





 19:00 07:00


 


Intake Total 0 ml 360 ml


 


Balance 0 ml 360 ml


 


  


 


Intake Oral 0 ml 


 


Other  360 ml


 


# Voids  2


 


# Bowel Movements  4











Laboratory Tests








Test


  8/13/20


15:15 8/13/20


16:57 8/13/20


17:41 8/14/20


05:45


 


White Blood Count


  5.4 K/UL


(4.8-10.8) 


  


  5.2 K/UL


(4.8-10.8)


 


Red Blood Count


  3.87 M/UL


(4.70-6.10)  L 


  


  4.03 M/UL


(4.70-6.10)  L


 


Hemoglobin


  12.3 G/DL


(14.2-18.0)  L 


  


  12.4 G/DL


(14.2-18.0)  L


 


Hematocrit


  35.1 %


(42.0-52.0)  L 


  


  37.4 %


(42.0-52.0)  L


 


Mean Corpuscular Volume 91 FL (80-99)     93 FL (80-99)  


 


Mean Corpuscular Hemoglobin


  31.9 PG


(27.0-31.0)  H 


  


  30.8 PG


(27.0-31.0)


 


Mean Corpuscular Hemoglobin


Concent 35.2 G/DL


(32.0-36.0) 


  


  33.1 G/DL


(32.0-36.0)


 


Red Cell Distribution Width


  12.2 %


(11.6-14.8) 


  


  11.9 %


(11.6-14.8)


 


Platelet Count


  142 K/UL


(150-450)  L 


  


  160 K/UL


(150-450)


 


Mean Platelet Volume


  7.6 FL


(6.5-10.1) 


  


  7.8 FL


(6.5-10.1)


 


Neutrophils (%) (Auto)


  65.7 %


(45.0-75.0) 


  


  56.7 %


(45.0-75.0)


 


Lymphocytes (%) (Auto)


  24.5 %


(20.0-45.0) 


  


  31.9 %


(20.0-45.0)


 


Monocytes (%) (Auto)


  6.6 %


(1.0-10.0) 


  


  7.4 %


(1.0-10.0)


 


Eosinophils (%) (Auto)


  2.5 %


(0.0-3.0) 


  


  3.2 %


(0.0-3.0)  H


 


Basophils (%) (Auto)


  0.8 %


(0.0-2.0) 


  


  0.7 %


(0.0-2.0)


 


Sodium Level


  135 MMOL/L


(136-145)  L 


  


  137 MMOL/L


(136-145)


 


Potassium Level


  4.1 MMOL/L


(3.5-5.1) 


  


  4.0 MMOL/L


(3.5-5.1)


 


Chloride Level


  99 MMOL/L


() 


  


  101 MMOL/L


()


 


Carbon Dioxide Level


  31 MMOL/L


(21-32) 


  


  30 MMOL/L


(21-32)


 


Anion Gap


  5 mmol/L


(5-15) 


  


  6 mmol/L


(5-15)


 


Blood Urea Nitrogen


  16 mg/dL


(7-18) 


  


  17 mg/dL


(7-18)


 


Creatinine


  1.2 MG/DL


(0.55-1.30) 


  


  1.2 MG/DL


(0.55-1.30)


 


Estimat Glomerular Filtration


Rate > 60 mL/min


(>60) 


  


  > 60 mL/min


(>60)


 


Glucose Level


  101 MG/DL


() 


  


  96 MG/DL


()


 


Calcium Level


  9.0 MG/DL


(8.5-10.1) 


  


  9.0 MG/DL


(8.5-10.1)


 


Total Bilirubin


  0.3 MG/DL


(0.2-1.0) 


  


  


 


 


Aspartate Amino Transf


(AST/SGOT) 17 U/L (15-37)


  


  


  


 


 


Alanine Aminotransferase


(ALT/SGPT) 11 U/L (12-78)


L 


  


  


 


 


Alkaline Phosphatase


  140 U/L


()  H 


  


  


 


 


Troponin I


  0.000 ng/mL


(0.000-0.056) 


  


  


 


 


Total Protein


  8.1 G/DL


(6.4-8.2) 


  


  


 


 


Albumin


  3.3 G/DL


(3.4-5.0)  L 


  


  


 


 


Globulin 4.8 g/dL     


 


Albumin/Globulin Ratio


  0.7 (1.0-2.7)


L 


  


  


 


 


Thyroid Stimulating Hormone


(TSH) 2.515 uiU/mL


(0.358-3.740) 


  


  


 


 


Prothrombin Time


  


  12.0 SEC


(9.30-11.50)  H 


  


 


 


Prothromb Time International


Ratio 


  1.1 (0.9-1.1)  


  


  


 


 


Activated Partial


Thromboplast Time 


  35 SEC (23-33)


H 


  


 


 


Urine Color   Yellow   


 


Urine Appearance   Clear   


 


Urine pH   7 (4.5-8.0)   


 


Urine Specific Gravity


  


  


  1.010


(1.005-1.035) 


 


 


Urine Protein


  


  


  Negative


(NEGATIVE) 


 


 


Urine Glucose (UA)


  


  


  Negative


(NEGATIVE) 


 


 


Urine Ketones


  


  


  Negative


(NEGATIVE) 


 


 


Urine Blood


  


  


  Negative


(NEGATIVE) 


 


 


Urine Nitrite


  


  


  Negative


(NEGATIVE) 


 


 


Urine Bilirubin


  


  


  Negative


(NEGATIVE) 


 


 


Urine Urobilinogen


  


  


  8 MG/DL


(0.0-1.0)  H 


 


 


Urine Leukocyte Esterase


  


  


  1+ (NEGATIVE)


H 


 


 


Urine RBC


  


  


  0-2 /HPF (0 -


0)  H 


 


 


Urine WBC


  


  


  2-4 /HPF (0 -


0) 


 


 


Urine Squamous Epithelial


Cells 


  


  None /LPF


(NONE/OCC) 


 


 


Urine Bacteria


  


  


  Occasional


/HPF (NONE) 


 


 


Urine Opiates Screen


  


  


  Negative


(NEGATIVE) 


 


 


Urine Barbiturates Screen


  


  


  Negative


(NEGATIVE) 


 


 


Phencyclidine (PCP) Screen


  


  


  Negative


(NEGATIVE) 


 


 


Urine Amphetamines Screen


  


  


  Negative


(NEGATIVE) 


 


 


Urine Benzodiazepines Screen


  


  


  Negative


(NEGATIVE) 


 


 


Urine Cocaine Screen


  


  


  Negative


(NEGATIVE) 


 


 


Urine Marijuana (THC) Screen


  


  


  Negative


(NEGATIVE) 


 


 


Vitamin B12 Level    Pending  


 


Folate    Pending  


 


Rapid Plasma Reagin    Pending  








Height (Feet):  5


Height (Inches):  10.00


Weight (Pounds):  200


Medications





Current Medications








 Medications


  (Trade)  Dose


 Ordered  Sig/Mar


 Route


 PRN Reason  Start Time


 Stop Time Status Last Admin


Dose Admin


 


 Acetaminophen


  (Tylenol)  650 mg  Q4H  PRN


 ORAL


 Mild Pain (Pain Scale 1-3)  8/13/20 17:45


 9/12/20 17:44   


 


 


 Dextrose


  (Dextrose 50%)  25 ml  Q30M  PRN


 IV


 Hypoglycemia  8/13/20 17:45


 11/11/20 17:44   


 


 


 Dextrose


  (Dextrose 50%)  50 ml  Q30M  PRN


 IV


 Hypoglycemia  8/13/20 17:45


 11/11/20 17:44   


 


 


 Diphenhydramine


 HCl


  (Benadryl)  25 mg  Q6H  PRN


 ORAL


 Itching/Pruritis  8/13/20 17:45


 9/12/20 17:44   


 


 


 Docusate Sodium


  (Colace)  100 mg  EVERY 12  HOURS


 ORAL


   8/13/20 21:00


 9/12/20 20:59  8/13/20 21:35


 


 


 Gabapentin


  (Neurontin)  300 mg  THREE TIMES A  DAY


 ORAL


   8/13/20 21:00


 9/12/20 20:59  8/13/20 21:35


 


 


 Heparin Sodium


  (Porcine)


  (Heparin 5000


 units/ml)  5,000 units  EVERY 12  HOURS


 SUBQ


   8/13/20 21:00


 9/27/20 20:59  8/13/20 21:36


 


 


 Ibuprofen


  (Motrin)  600 mg  Q6H  PRN


 ORAL


 For Pain  8/13/20 18:00


 9/12/20 17:59   


 


 


 Lorazepam


  (Ativan)  2 mg  QHS


 ORAL


   8/13/20 21:00


 8/20/20 20:59  8/13/20 21:35


 


 


 Multivitamins


 Therapeutic


  (Therapeutic


 Multivitamin)  1 ea  DAILY


 ORAL


   8/14/20 09:00


 9/13/20 08:59   


 


 


 Ondansetron HCl


  (Zofran)  4 mg  Q6H  PRN


 IVP


 Nausea & Vomiting  8/13/20 17:45


 9/12/20 17:44   


 


 


 Oxycodone HCl


  (OxyCONTIN)  80 mg  TID


 ORAL


   8/13/20 21:00


 8/20/20 20:59  8/13/20 21:35


 


 


 Quetiapine


 Fumarate


  (SEROqueL)  200 mg  DAILY


 ORAL


   8/14/20 09:00


 9/28/20 08:59   


 











Assessment/Plan


Diagnosis


Axis I:


59 year old man with prior substance abuse, previous stroke, anxiety who comes 

in with recurrent falls and ataxia





#Recurrent fall


#Ataxia


#Encephalomalacia on brain MRI


#Evidence of chronic stroke on brain MRI


#history of cervical spine fusion


#history of substance abuse


#Anxiety


-admit to medical service


-spoke with PCP, Dr. MARGIE Hess


-Check TSH,B12,Folate,RPR


-continue gabapentin


-Neurology consulted, Dr. Rosario


-Cardiology consulted per PCPs request, Dr. Shaver


-Fall precautions


-PT/OT eval


-VTE PPx HSQ











Zain Carpenter MD Aug 14, 2020 07:39

## 2020-08-15 VITALS — DIASTOLIC BLOOD PRESSURE: 78 MMHG | SYSTOLIC BLOOD PRESSURE: 124 MMHG

## 2020-08-15 VITALS — DIASTOLIC BLOOD PRESSURE: 83 MMHG | SYSTOLIC BLOOD PRESSURE: 142 MMHG

## 2020-08-15 VITALS — SYSTOLIC BLOOD PRESSURE: 122 MMHG | DIASTOLIC BLOOD PRESSURE: 79 MMHG

## 2020-08-15 VITALS — SYSTOLIC BLOOD PRESSURE: 127 MMHG | DIASTOLIC BLOOD PRESSURE: 72 MMHG

## 2020-08-15 VITALS — DIASTOLIC BLOOD PRESSURE: 68 MMHG | SYSTOLIC BLOOD PRESSURE: 125 MMHG

## 2020-08-15 VITALS — DIASTOLIC BLOOD PRESSURE: 74 MMHG | SYSTOLIC BLOOD PRESSURE: 118 MMHG

## 2020-08-15 VITALS — SYSTOLIC BLOOD PRESSURE: 145 MMHG | DIASTOLIC BLOOD PRESSURE: 85 MMHG

## 2020-08-15 VITALS — DIASTOLIC BLOOD PRESSURE: 65 MMHG | SYSTOLIC BLOOD PRESSURE: 128 MMHG

## 2020-08-15 VITALS — SYSTOLIC BLOOD PRESSURE: 140 MMHG | DIASTOLIC BLOOD PRESSURE: 79 MMHG

## 2020-08-15 VITALS — SYSTOLIC BLOOD PRESSURE: 129 MMHG | DIASTOLIC BLOOD PRESSURE: 68 MMHG

## 2020-08-15 VITALS — DIASTOLIC BLOOD PRESSURE: 76 MMHG | SYSTOLIC BLOOD PRESSURE: 150 MMHG

## 2020-08-15 VITALS — SYSTOLIC BLOOD PRESSURE: 121 MMHG | DIASTOLIC BLOOD PRESSURE: 84 MMHG

## 2020-08-15 RX ADMIN — LACTULOSE SCH GM: 20 SOLUTION ORAL at 12:12

## 2020-08-15 RX ADMIN — LACTULOSE SCH GM: 20 SOLUTION ORAL at 15:08

## 2020-08-15 RX ADMIN — OXYCODONE HYDROCHLORIDE SCH MG: 20 TABLET, FILM COATED, EXTENDED RELEASE ORAL at 08:46

## 2020-08-15 RX ADMIN — DOCUSATE SODIUM SCH MG: 100 CAPSULE, LIQUID FILLED ORAL at 08:47

## 2020-08-15 RX ADMIN — LORAZEPAM SCH MG: 1 TABLET ORAL at 20:40

## 2020-08-15 RX ADMIN — Medication SCH EA: at 08:46

## 2020-08-15 RX ADMIN — QUETIAPINE SCH MG: 200 TABLET, FILM COATED ORAL at 08:47

## 2020-08-15 RX ADMIN — HEPARIN SODIUM SCH UNITS: 5000 INJECTION INTRAVENOUS; SUBCUTANEOUS at 08:48

## 2020-08-15 RX ADMIN — CYANOCOBALAMIN SCH MCG: 1000 INJECTION, SOLUTION INTRAMUSCULAR at 08:48

## 2020-08-15 RX ADMIN — DOCUSATE SODIUM SCH MG: 100 CAPSULE, LIQUID FILLED ORAL at 20:41

## 2020-08-15 RX ADMIN — HEPARIN SODIUM SCH UNITS: 5000 INJECTION INTRAVENOUS; SUBCUTANEOUS at 20:46

## 2020-08-15 RX ADMIN — OXYCODONE HYDROCHLORIDE SCH MG: 20 TABLET, FILM COATED, EXTENDED RELEASE ORAL at 17:01

## 2020-08-15 RX ADMIN — CHLORHEXIDINE GLUCONATE SCH APPLIC: 213 SOLUTION TOPICAL at 20:40

## 2020-08-15 RX ADMIN — LACTULOSE SCH GM: 20 SOLUTION ORAL at 18:01

## 2020-08-15 NOTE — NEUROLOGY PROGRESS NOTE
Interim History


Mr. Sven Moore is a 59-year-old, right-handed, 


 gentleman, who does have a past history of 


a cervical spine injury for which he has had surgery, 


a severe head injury the exact details of which he cannot 


tell me about, anxiety, polysubstance abuse, and 


unsteadiness on his feet.





For the last 3 weeks or so he has been increasingly 


unsteady on his feet, has fallen down on numerous 


occasions, and has been more confused and altered 


with regards to his mental state. He himself is unable 


to give me much of a history.





He feels much better today.





He feels that the mind is clearer.





He is able to process information in a better manner.





The abnormal body movements have also decreased significantly.





He feels generally stronger.





He denies any new neurological symptoms.





Review of Systems


Neuro Review of Systems


Benign.





Objective


Physical Exam





Last Vital Signs








  Date Time  Temp Pulse Resp B/P (MAP) Pulse Ox O2 Delivery O2 Flow Rate FiO2


 


8/15/20 08:00 98.6 94 17 140/79 (99) 99   


 


8/14/20 21:00      Room Air  


 


8/13/20 15:00        98











Laboratory Tests








Test


  8/14/20


14:39 8/14/20


15:50


 


Arterial Blood pH


  7.368


(7.350-7.450) 


 


 


Arterial Blood Partial


Pressure CO2 47.5 mmHg


(35.0-45.0)  H 


 


 


Arterial Blood Partial


Pressure O2 63.7 mmHg


(75.0-100.0)  L 


 


 


Arterial Blood HCO3


  26.7 mmol/L


(22.0-26.0)  H 


 


 


Arterial Blood Oxygen


Saturation 91.5 %


()  L 


 


 


Arterial Blood Base Excess 0.9 (-2-2)   


 


Christopher Test Positive   


 


Ammonia


  


  43 umol/L


(11-32)  H


 


Vitamin D 25-Hydroxy  Pending  


 


25-Hydroxy Vitamin D2  Pending  


 


25-Hydroxy Vitamin D3  Pending  











Neurologic Exam


Objective


PHYSICAL EXAMINATION:


GENERAL: He is a well-developed, relatively well-nourished, 


 gentleman, lying in bed, in no acute distress. 


HEAD: Normocephalic and atraumatic. 


NECK: No neck rigidity was observed. 


Range of motion was decreased in the neck.


EENT: Benign. 





NEUROLOGIC EXAMINATION:  


MENTAL STATUS EXAMINATION: 


He was awake and alert.


He was oriented to self and [facility].  


He did not know the date month or year.


He was able to recall 3/3 words immediately but could 


only remember 1/3 after 1 minute and 3 minutes.


He was unable to remember the names of the present 


[place] president and prior [place] presidents.


His mathematical skills were impaired.


His visuospatial function was also impaired.


SPEECH: He had no dysarthria.


LANGUAGE: He was able to comprehend and express 


himself relatively well.


CRANIAL NERVE EXAMINATION:  


II: The visual fields were intact on confrontation testing.


III, IV, VI: External ocular movements were full and pupils 


3 mm in diameter equal, round, regular and reactive to light.  


V:  The facial sensations were normal, and the temporales, 


masseters and pterygoids functioned normally. 


VII: He had a mild left greater than right seventh central 


facial paresis.


VIII: The patient was able to hear well bilaterally and had 


no nystagmus. 


IX: The palate moved symmetrically on phonation. 


X:  There was no hoarseness of voice.  


XI:  The sternocleidomastoids and trapezii functioned normally.  


XII:  The tongue was in the midline without any fasciculations 


or atrophy.


MOTOR SYSTEM:  


The tone was increased in both lower extremities with mild 


degree of spasticity.


Examination of muscle mass revealed no focal wasting. 


Examination of power revealed G 5/5 power in all muscle 


groups tested.


SENSORY EXAMINATION: He was able to localize light 


touch all over his body.


REFLEXES:  1+ and bilaterally symmetric at the biceps, 


triceps, brachioradialis, and knees. 0 at both ankles.  


The plantar responses were flexor bilaterally.  


COORDINATION: He performed well on finger-to-nose


testing bilaterally.


STANCE & GAIT: Could not be tested.


ABNORMAL MOVEMENTS:


Asterixis: G Trace/4 in both upper extremities.


Myoclonus: G 0/4





Impression/Recommendations


Diagnostic Impression


DIAGNOSTIC IMPRESSION:


1. Mr. Sven Moore is a 59-year-old, right-handed, 


 gentleman, who does have a past history of 


a cervical spine injury for which he has had surgery, 


a severe head injury the exact details of which he cannot 


tell me about, anxiety, polysubstance abuse, and 


unsteadiness on his feet.


2. For the last 3 weeks or so he has been increasingly 


unsteady on his feet, has fallen down on numerous 


occasions, and has been more confused and altered 


with regards to his mental state. He himself is unable 


to give me much of a history.


3. He feels much better today. He feels that the mind 


is clearer. He is able to process information in a better 


manner. The abnormal body movements have also 


decreased significantly. He feels generally stronger.


He denies any new neurological symptoms.


4.  On neurological examination, at this time, his level 


of alertness has normalized.  He is well oriented except 


for the exact date month and year.  His memory is still


impaired but better than yesterday.  His visuospatial 


function and higher cognitive function is still impaired.  


His dysarthria has resolved.  His language has improved 


significantly. He has a left greater than right seventh 


central facial paresis.  The strength in all his extremities 


has improved significantly. His deep tendon reflexes are 


globally diminished.  Stance and gait were not tested.  


He exhibits no asterixis and the myoclonus has resolved.


5.  The MRI scan of the brain reveals bilateral frontal and 


right temporal encephalomalacia in a traumatic pattern.


6.  The MRI scan of the cervical spine reveals postsurgical 


changes with mild to moderate multilevel disease but no 


cord compression.


7.  Laboratory data on my initial evaluation revealed a mild 


anemia with a hemoglobin of 12.4 g, elevated alkaline 


phosphatase 840, low albumin at 3.3, low vitamin B12 


level at 304, low folate at 6 and normal TSH.


8.  Further laboratory tests have revealed an elevated 


ammonia at 43.  The arterial blood gas revealed a normal 


pH at 7.36 with an elevated PCO2 at 47.5 and a low 


PO2 at 63.7.


9.  The patient's history and neurological examination are 


most consistent with an encephalopathic picture.  The 


encephalopathy is of a metabolic nature most probably 


related to the hyperammonemia, hypoxemia, and hypercarbia.


10.  The patient is significantly less encephalopathic today.


11.  The frequent falls may be associated with the 


encephalopathy and asterixis.


Recommendations


RECOMMENDATIONS:


1.  Continue present management.


2.  Correct all toxic metabolic imbalances including reversal 


of folate and B12 deficiency, correction of hypoxemia and


hypercarbia, and correction of hyperammonemia.


3.  We will review EEG that was done yesterday.


4.  Observe closely.











__________________________________


Tio Martin M.D., M.S.P.H.


Neurologist & Clinical Neurophysiologist











Tio Martin MD Aug 15, 2020 10:07

## 2020-08-15 NOTE — GENERAL PROGRESS NOTE
Assessment/Plan


Assessment/Plan:


59 year old man with prior substance abuse, previous stroke, anxiety who comes 

in with recurrent falls and ataxia





#Acute metabolic encephalopathy


#Folate deficiency


#Recurrent falls


#Ataxia


#Encephalomalacia on brain MRI


#Evidence of chronic stroke on brain MRI


#history of cervical spine fusion


#history of substance abuse


#Anxiety


-cont inaptient level of care


-spoke with PCP, Dr. MARGIE Hess


-Started oral Folic Acid supplementation


-continue gabapentin


-Neurology recs appreciated


-Cardiology consulted per PCPs request, Dr. Shaver - fidencio


-Fall precautions


-PT/OT eval noted, may need rehab


-VTE PPx HSQ


-Spoke with mother





Subjective


Date patient seen:  Aug 15, 2020


Time patient seen:  13:16


ROS Limited/Unobtainable:  Yes


Constitutional:  Denies: chills, fever


Cardiovascular:  Denies: chest pain


Respiratory:  Denies: cough, shortness of breath


Allergies:  


Coded Allergies:  


     AMOXICILLIN (Verified  Allergy, Unknown, 4/4/09)


     PENICILLINS (Verified  Allergy, Unknown, Rash, 8/13/20)


     MEPERIDINE (Verified  Adverse Reaction, Mild, NAUSEA, 5/4/11)


     MORPHINE (Verified  Adverse Reaction, Mild, NAUSEA, 5/4/11)


Uncoded Allergies:  


     PENICILIN (Allergy, Unknown, 8/13/20)


Subjective


Follow up for ataxia, acute metabolic encephalopathy., frequent falls, folate 

deficiency


More lucid today


Overall condition is improved





Objective





Last 24 Hour Vital Signs








  Date Time  Temp Pulse Resp B/P (MAP) Pulse Ox O2 Delivery O2 Flow Rate FiO2


 


8/15/20 12:00 97.7 88 18 118/74 (89) 97   


 


8/15/20 09:00      Room Air  


 


8/15/20 08:00 98.6 94 17 140/79 (99) 99   


 


8/15/20 04:00 98.2 96 16 150/76 (100) 98   


 


8/15/20 00:00 98.1 86 17 122/79 (93) 93   


 


8/14/20 21:00      Room Air  


 


8/14/20 20:00 98.2 82 20 150/83 (105) 99   


 


8/14/20 16:00 98.4 79 20 115/69 (84) 96   

















Intake and Output  


 


 8/14/20 8/15/20





 19:00 07:00


 


Output Total  350 ml


 


Balance  -350 ml


 


  


 


Output Urine Total  350 ml


 


# Bowel Movements 2 








Laboratory Tests


8/14/20 14:39: 


Arterial Blood pH 7.368, Arterial Blood Partial Pressure CO2 47.5H, Arterial 

Blood Partial Pressure O2 63.7L, Arterial Blood HCO3 26.7H, Arterial Blood 

Oxygen Saturation 91.5L, Arterial Blood Base Excess 0.9, Christopher Test Positive


8/14/20 15:50: 


Ammonia 43H, Vitamin D 25-Hydroxy [Pending], 25-Hydroxy Vitamin D2 [Pending], 25

-Hydroxy Vitamin D3 [Pending]


Height (Feet):  5


Height (Inches):  10.00


Weight (Pounds):  200


General Appearance:  alert


Neck:  normal alignment, supple


Cardiovascular:  normal rate, regular rhythm


Respiratory/Chest:  lungs clear, normal breath sounds


Abdomen:  non tender, soft











Zain Carpenter MD Aug 15, 2020 14:34

## 2020-08-15 NOTE — ELECTROENCEPHALOGRAM
DATE OF PROCEDURE:  2020

REQUESTING PHYSICIAN:  _____.



READING PHYSICIAN:  Tio Martin MD.



PROCEDURE PERFORMED:  Electroencephalogram.



DATE OF TRACIN2020.



HISTORY:  This EEG was performed on a 59-year-old gentleman with a history

of multiple medical problems including an alteration in his mental state

and frequent falls in the near past.  The purpose of this EEG was to

evaluate the patient for the degree and type of cerebral dysfunction.



TECHNICAL NOTE:  This EEG was performed on a [employer]

Unit with electrodes placed on the scalp according to the international

10-20 system.  Scalp-to-scalp and scalp-to-ear montages were used.  A

large array of montages were available for review of the EEG with digital

reformatting.  The EEG was technically satisfactory and was performed in

the awake and drowsy states.



OBSERVATIONS:  In the best awake state, the background activity consisted

of 6 to 7 hertz theta activity with triphasic waveforms.  Drowsiness was

characterized by slowing of the background in the 4 to 5 hertz theta range

with intermixed delta frequencies and in addition, continued triphasic

waveforms.  In addition, during drowsiness bilateral frontotemporal

polymorphic delta activity was also seen.  No epileptiform discharges were

noted.



IMPRESSION:  This is an abnormal EEG characterized by,



1. Slowing of the background in the 6 to 7 hertz theta range in the best

awake state.

2. The presence of triphasic waveforms.

3. The presence of bilateral frontotemporal polymorphic delta activity

seen during drowsiness.



COMMENT:

1. The study is consistent with an encephalopathy of a moderate degree

with a metabolic component is evidenced by the triphasic waveforms.

2. Bilateral frontotemporal dysfunction.









  ______________________________________________

  Tio Martin M.D.





DR:  KAMI

D:  08/15/2020 10:28

T:  08/15/2020 23:22

JOB#:  [id number]

CC:

## 2020-08-15 NOTE — DIAGNOSTIC IMAGING REPORT
EXAM:

  CT Head Without Intravenous Contrast

 

CLINICAL HISTORY:

  FALL

 

TECHNIQUE:

  Axial computed tomography images of the head/brain without intravenous 

contrast.  CTDI is 106.8 mGy and DLP is 1214.9 mGy-cm.  One or more of 

the following dose reduction techniques were used: automated exposure 

control, adjustment of the mA and/or kV according to patient size, use of 

iterative reconstruction technique.

 

COMPARISON:

  8/3/2020

 

FINDINGS:

  Brain:  Unchanged right frontal small encephalomalacia.  No hemorrhage.

  Ventricles:  Unremarkable.  No ventriculomegaly.

  Bones/joints:  Unremarkable.  No acute fracture.

  Soft tissues:  Unremarkable.

  Vasculature:  Mild chronic senescent findings to include parenchymal 

volume loss, cerebrovascular atherosclerosis, nonspecific white matter 

hypodensity likely secondary to chronic microvascular ischemia.

  Sinuses:  Unremarkable as visualized.  No acute sinusitis.

  Mastoid air cells:  Unremarkable as visualized.  No mastoid effusion.

 

IMPRESSION:     

1.  No acute intracranial abnormality.

2.  Mild chronic senescent findings above.

3.  Unchanged right frontal small encephalomalacia.

## 2020-08-16 VITALS — SYSTOLIC BLOOD PRESSURE: 132 MMHG | DIASTOLIC BLOOD PRESSURE: 69 MMHG

## 2020-08-16 VITALS — DIASTOLIC BLOOD PRESSURE: 71 MMHG | SYSTOLIC BLOOD PRESSURE: 128 MMHG

## 2020-08-16 VITALS — DIASTOLIC BLOOD PRESSURE: 75 MMHG | SYSTOLIC BLOOD PRESSURE: 153 MMHG

## 2020-08-16 VITALS — SYSTOLIC BLOOD PRESSURE: 120 MMHG | DIASTOLIC BLOOD PRESSURE: 67 MMHG

## 2020-08-16 VITALS — SYSTOLIC BLOOD PRESSURE: 118 MMHG | DIASTOLIC BLOOD PRESSURE: 77 MMHG

## 2020-08-16 VITALS — DIASTOLIC BLOOD PRESSURE: 73 MMHG | SYSTOLIC BLOOD PRESSURE: 120 MMHG

## 2020-08-16 VITALS — SYSTOLIC BLOOD PRESSURE: 144 MMHG | DIASTOLIC BLOOD PRESSURE: 78 MMHG

## 2020-08-16 RX ADMIN — LACTULOSE SCH GM: 20 SOLUTION ORAL at 17:23

## 2020-08-16 RX ADMIN — Medication SCH EA: at 09:12

## 2020-08-16 RX ADMIN — DOCUSATE SODIUM SCH MG: 100 CAPSULE, LIQUID FILLED ORAL at 20:37

## 2020-08-16 RX ADMIN — LORAZEPAM SCH MG: 1 TABLET ORAL at 20:37

## 2020-08-16 RX ADMIN — QUETIAPINE SCH MG: 200 TABLET, FILM COATED ORAL at 09:12

## 2020-08-16 RX ADMIN — HEPARIN SODIUM SCH UNITS: 5000 INJECTION INTRAVENOUS; SUBCUTANEOUS at 20:37

## 2020-08-16 RX ADMIN — LACTULOSE SCH GM: 20 SOLUTION ORAL at 17:33

## 2020-08-16 RX ADMIN — OXYCODONE HYDROCHLORIDE SCH MG: 20 TABLET, FILM COATED, EXTENDED RELEASE ORAL at 09:14

## 2020-08-16 RX ADMIN — OXYCODONE HYDROCHLORIDE SCH MG: 20 TABLET, FILM COATED, EXTENDED RELEASE ORAL at 01:59

## 2020-08-16 RX ADMIN — CHLORHEXIDINE GLUCONATE SCH APPLIC: 213 SOLUTION TOPICAL at 20:36

## 2020-08-16 RX ADMIN — LACTULOSE SCH GM: 20 SOLUTION ORAL at 13:28

## 2020-08-16 RX ADMIN — CYANOCOBALAMIN SCH MCG: 1000 INJECTION, SOLUTION INTRAMUSCULAR at 09:12

## 2020-08-16 RX ADMIN — DOCUSATE SODIUM SCH MG: 100 CAPSULE, LIQUID FILLED ORAL at 09:12

## 2020-08-16 RX ADMIN — LACTULOSE SCH GM: 20 SOLUTION ORAL at 15:45

## 2020-08-16 RX ADMIN — OXYCODONE HYDROCHLORIDE SCH MG: 20 TABLET, FILM COATED, EXTENDED RELEASE ORAL at 17:24

## 2020-08-16 RX ADMIN — HEPARIN SODIUM SCH UNITS: 5000 INJECTION INTRAVENOUS; SUBCUTANEOUS at 09:12

## 2020-08-16 RX ADMIN — DOCUSATE SODIUM SCH MG: 100 CAPSULE, LIQUID FILLED ORAL at 09:00

## 2020-08-16 NOTE — NEUROLOGY PROGRESS NOTE
Interim History


Mr. Sven Moore is a 59-year-old, right-handed, 


 gentleman, who does have a past history of 


a cervical spine injury for which he has had surgery, 


a severe head injury the exact details of which he cannot 


tell me about, anxiety, polysubstance abuse, and 


unsteadiness on his feet.





For the last 3 weeks or so he has been increasingly 


unsteady on his feet, has fallen down on numerous 


occasions, and has been more confused and altered 


with regards to his mental state. He himself is unable 


to give me much of a history.





He feels about the same as yesterday.





He feels that the mind is clear.





The abnormal body movements have not been bothersome.





He feels stronger.





He denies any new neurological symptoms.





As per his nurse he was trying to walk on his own and was 


found down.





He denies any head injury





Review of Systems


Neuro Review of Systems


Benign.





Objective


Physical Exam





Last Vital Signs








  Date Time  Temp Pulse Resp B/P (MAP) Pulse Ox O2 Delivery O2 Flow Rate FiO2


 


8/16/20 09:00      Room Air  


 


8/16/20 08:00 98.3 100 20 144/78 (100) 96   


 


8/13/20 15:00        98











Laboratory Tests








Test


  8/15/20


21:30


 


POC Whole Blood Glucose


  145 MG/DL


()  H











Neurologic Exam


Objective


PHYSICAL EXAMINATION:


GENERAL: He is a well-developed, relatively well-nourished, 


 gentleman, lying in bed, in no acute distress. 


HEAD: Normocephalic and atraumatic. 


NECK: No neck rigidity was observed. 


Range of motion was decreased in the neck.


EENT: Benign. 





NEUROLOGIC EXAMINATION:  


MENTAL STATUS EXAMINATION: 


He was awake but not completely alert.


He was oriented to self and hospital only.  


He was able to recall 3/3 words immediately but could 


only remember any of them after 1 minute and 3 minutes.


He was unable to remember the names of the present 


[place] president and prior [place] presidents.


His mathematical skills were impaired.


His visuospatial function was also impaired.


SPEECH: He had no dysarthria.


LANGUAGE: He was able to comprehend and express 


himself relatively well.


CRANIAL NERVE EXAMINATION:  


II: The visual fields were intact on confrontation testing.


III, IV, VI: External ocular movements were full and pupils 


3 mm in diameter equal, round, regular and reactive to light.  


V:  The facial sensations were normal, and the temporales, 


masseters and pterygoids functioned normally. 


VII: He had a mild left greater than right seventh central 


facial paresis.


VIII: The patient was able to hear well bilaterally and had 


no nystagmus. 


IX: The palate moved symmetrically on phonation. 


X:  There was no hoarseness of voice.  


XI:  The sternocleidomastoids and trapezii functioned normally.  


XII:  The tongue was in the midline without any fasciculations 


or atrophy.


MOTOR SYSTEM:  


The tone was increased in both lower extremities with mild 


degree of spasticity.


Examination of muscle mass revealed no focal wasting. 


Examination of power revealed G 5/5 power in all muscle 


groups tested.


SENSORY EXAMINATION: He was able to localize light 


touch all over his body.


REFLEXES:  1+ and bilaterally symmetric at the biceps, 


triceps, brachioradialis, and knees. 0 at both ankles.  


The plantar responses were flexor bilaterally.  


COORDINATION: He performed well on finger-to-nose


testing bilaterally.


STANCE & GAIT: Could not be tested.


ABNORMAL MOVEMENTS:


Asterixis: G 1/4 in both upper extremities.


Myoclonus: G 0/4





Impression/Recommendations


Diagnostic Impression


DIAGNOSTIC IMPRESSION:


1. Mr. Sven Moore is a 59-year-old, right-handed, 


 gentleman, who does have a past history of 


a cervical spine injury for which he has had surgery, 


a severe head injury the exact details of which he cannot 


tell me about, anxiety, polysubstance abuse, and 


unsteadiness on his feet.


2. For the last 3 weeks or so he has been increasingly 


unsteady on his feet, has fallen down on numerous 


occasions, and has been more confused and altered 


with regards to his mental state. He himself is unable 


to give me much of a history.


3. He feels about the same as yesterday. He feels that 


the mind is clear. The abnormal body movements have


not been bothersome. He feels stronger. He denies


any new neurological symptoms. As per his nurse he


was trying to walk on his own and was found down.


He denies any head injury


4.  On neurological examination, at this time, he is 


exhibiting waxing and waning of alertness. He is 


oriented to self only.  His memory is impaired.  His 


visuospatial function and higher cognitive function 


is also impaired. He continues to have language 


problems. He has a left greater than right seventh 


central facial paresis.  The strength in all his extremities 


is stable. His deep tendon reflexes are globally diminished.  


Stance and gait were not tested. He exhibits mild asterixis 


but the myoclonus has resolved.


5.  The MRI scan of the brain reveals bilateral frontal and 


right temporal encephalomalacia in a traumatic pattern.


6.  The MRI scan of the cervical spine reveals postsurgical 


changes with mild to moderate multilevel disease but no 


cord compression.


7.  Laboratory data on my initial evaluation revealed a mild 


anemia with a hemoglobin of 12.4 g, elevated alkaline 


phosphatase 840, low albumin at 3.3, low vitamin B12 


level at 304, low folate at 6 and normal TSH.


8.  Further laboratory tests have revealed an elevated 


ammonia at 43.  The arterial blood gas revealed a normal 


pH at 7.36 with an elevated PCO2 at 47.5 and a low 


PO2 at 63.7.


9. The EEG reveals a moderate metabolic encephalopathy.


10.  The patient's history and neurological examination are 


most consistent with an encephalopathic picture.  The 


encephalopathy is of a metabolic nature most probably 


related to the hyperammonemia, hypoxemia, and hypercarbia.


11.  The patient is again more encephalopathic today.


12.  The frequent falls may be associated with the 


encephalopathy and asterixis.


Recommendations


RECOMMENDATIONS:


1.  Continue present management.


2.  Correct all toxic metabolic imbalances including reversal 


of folate and B12 deficiency, correction of hypoxemia and


hypercarbia, and correction of hyperammonemia.


3.  Lactulose 30 ml x 3 again.


4.  Observe closely.











__________________________________


Tio Martin M.D., M.S.P.H.


Neurologist & Clinical Neurophysiologist











Tio Martin MD Aug 16, 2020 12:02

## 2020-08-16 NOTE — GENERAL PROGRESS NOTE
Assessment/Plan


Assessment/Plan:


59 year old man with prior substance abuse, previous stroke, anxiety who comes 

in with recurrent falls and ataxia





#Acute metabolic encephalopathy


#Folate deficiency


#Recurrent falls


#Ataxia


#Encephalomalacia on brain MRI


#Evidence of chronic stroke on brain MRI


#history of cervical spine fusion


#history of substance abuse


#Anxiety


-cont inaptient level of care


-spoke with PCP, Dr. MARGIE Hess


-Continue folic acid 1 mg PO daily


-continue gabapentin


-Neurology following


-Cardiology consulted per PCPs request, Dr. Shaver - fidencio


-Strict fall precautions


-PT/OT eval noted, referred to [facility]


-VTE PPx HSQ





Subjective


Date patient seen:  Aug 16, 2020


Time patient seen:  12:01


ROS Limited/Unobtainable:  Yes


Constitutional:  Denies: fever


Cardiovascular:  Denies: chest pain


Respiratory:  Denies: cough, shortness of breath


Gastrointestinal/Abdominal:  Denies: abdominal pain


Allergies:  


Coded Allergies:  


     AMOXICILLIN (Verified  Allergy, Unknown, 4/4/09)


     PENICILLINS (Verified  Allergy, Unknown, Rash, 8/13/20)


     MEPERIDINE (Verified  Adverse Reaction, Mild, NAUSEA, 5/4/11)


     MORPHINE (Verified  Adverse Reaction, Mild, NAUSEA, 5/4/11)


Uncoded Allergies:  


     PENICILIN (Allergy, Unknown, 8/13/20)


All Systems:  reviewed and negative except above


Subjective


Follow up for ataxia, acute metabolic encephalopathy., frequent falls, folate 

deficiency


Fell overnight while getting up to go to bathroom, imaging including CT head, 

hip XR wee negative





Objective





Last 24 Hour Vital Signs








  Date Time  Temp Pulse Resp B/P (MAP) Pulse Ox O2 Delivery O2 Flow Rate FiO2


 


8/16/20 12:00 98.1 98 18 118/77 (91) 95   


 


8/16/20 09:00      Room Air  


 


8/16/20 08:00 98.3 100 20 144/78 (100) 96   


 


8/16/20 04:00 98.1 106 21 153/75 (101) 94   


 


8/16/20 02:30 98.1 102 18 132/69 (90) 95   


 


8/16/20 00:30 98.1 109 19 128/71 (90) 94   


 


8/16/20 00:00 98.2 110 19 128/71 (90) 94   


 


8/15/20 23:38 98.1    95 Room Air  


 


8/15/20 23:30 97.6 106 17 124/78 (93) 96   


 


8/15/20 23:19  117 19  95   


 


8/15/20 23:00 97.8 105 18 127/72 (90) 95   


 


8/15/20 22:30 97.6 110 17 125/68 (87) 95   


 


8/15/20 22:00 98.2 112 18 128/65 (86) 96   


 


8/15/20 21:45 98.1 113 18 129/68 (88) 96   


 


8/15/20 21:00      Room Air  


 


8/15/20 20:00 99.0 98 20 121/84 (96) 98   


 


8/15/20 16:00 98.7 91 18 142/83 (102) 98   

















Intake and Output  


 


 8/15/20 8/16/20





 19:00 07:00


 


Intake Total 750 ml 


 


Output Total  0 ml


 


Balance 750 ml 0 ml


 


  


 


Intake Oral 750 ml 


 


Output Urine Total  0 ml


 


# Voids 10 


 


# Bowel Movements 3 1








Laboratory Tests


8/15/20 21:30: POC Whole Blood Glucose 145H


Height (Feet):  5


Height (Inches):  10.00


Weight (Pounds):  200


General Appearance:  no apparent distress, alert


Neck:  normal alignment, supple


Cardiovascular:  normal rate, regular rhythm


Respiratory/Chest:  lungs clear, normal breath sounds











Zain Carpenter MD Aug 16, 2020 13:17

## 2020-08-17 VITALS — DIASTOLIC BLOOD PRESSURE: 83 MMHG | SYSTOLIC BLOOD PRESSURE: 118 MMHG

## 2020-08-17 VITALS — SYSTOLIC BLOOD PRESSURE: 136 MMHG | DIASTOLIC BLOOD PRESSURE: 83 MMHG

## 2020-08-17 VITALS — SYSTOLIC BLOOD PRESSURE: 124 MMHG | DIASTOLIC BLOOD PRESSURE: 83 MMHG

## 2020-08-17 VITALS — SYSTOLIC BLOOD PRESSURE: 115 MMHG | DIASTOLIC BLOOD PRESSURE: 73 MMHG

## 2020-08-17 VITALS — SYSTOLIC BLOOD PRESSURE: 131 MMHG | DIASTOLIC BLOOD PRESSURE: 73 MMHG

## 2020-08-17 VITALS — DIASTOLIC BLOOD PRESSURE: 79 MMHG | SYSTOLIC BLOOD PRESSURE: 121 MMHG

## 2020-08-17 LAB
ADD MANUAL DIFF: NO
ANION GAP SERPL CALC-SCNC: 11 MMOL/L (ref 5–15)
BASOPHILS NFR BLD AUTO: 1.5 % (ref 0–2)
BUN SERPL-MCNC: 16 MG/DL (ref 7–18)
CALCIUM SERPL-MCNC: 9.1 MG/DL (ref 8.5–10.1)
CHLORIDE SERPL-SCNC: 101 MMOL/L (ref 98–107)
CO2 SERPL-SCNC: 27 MMOL/L (ref 21–32)
CREAT SERPL-MCNC: 1.2 MG/DL (ref 0.55–1.3)
EOSINOPHIL NFR BLD AUTO: 3.8 % (ref 0–3)
ERYTHROCYTE [DISTWIDTH] IN BLOOD BY AUTOMATED COUNT: 11.9 % (ref 11.6–14.8)
HCT VFR BLD CALC: 43.7 % (ref 42–52)
HGB BLD-MCNC: 14.5 G/DL (ref 14.2–18)
LYMPHOCYTES NFR BLD AUTO: 36.9 % (ref 20–45)
MCV RBC AUTO: 93 FL (ref 80–99)
MONOCYTES NFR BLD AUTO: 7.7 % (ref 1–10)
NEUTROPHILS NFR BLD AUTO: 50.1 % (ref 45–75)
PLATELET # BLD: 215 K/UL (ref 150–450)
POTASSIUM SERPL-SCNC: 3.7 MMOL/L (ref 3.5–5.1)
RBC # BLD AUTO: 4.71 M/UL (ref 4.7–6.1)
SODIUM SERPL-SCNC: 139 MMOL/L (ref 136–145)
WBC # BLD AUTO: 6.3 K/UL (ref 4.8–10.8)

## 2020-08-17 RX ADMIN — OXYCODONE HYDROCHLORIDE SCH MG: 20 TABLET, FILM COATED, EXTENDED RELEASE ORAL at 09:45

## 2020-08-17 RX ADMIN — HEPARIN SODIUM SCH UNITS: 5000 INJECTION INTRAVENOUS; SUBCUTANEOUS at 20:10

## 2020-08-17 RX ADMIN — Medication SCH EA: at 09:14

## 2020-08-17 RX ADMIN — QUETIAPINE SCH MG: 200 TABLET, FILM COATED ORAL at 09:14

## 2020-08-17 RX ADMIN — DOCUSATE SODIUM SCH MG: 100 CAPSULE, LIQUID FILLED ORAL at 20:09

## 2020-08-17 RX ADMIN — DOCUSATE SODIUM SCH MG: 100 CAPSULE, LIQUID FILLED ORAL at 09:14

## 2020-08-17 RX ADMIN — HEPARIN SODIUM SCH UNITS: 5000 INJECTION INTRAVENOUS; SUBCUTANEOUS at 09:17

## 2020-08-17 RX ADMIN — OXYCODONE HYDROCHLORIDE SCH MG: 20 TABLET, FILM COATED, EXTENDED RELEASE ORAL at 01:42

## 2020-08-17 RX ADMIN — CHLORHEXIDINE GLUCONATE SCH APPLIC: 213 SOLUTION TOPICAL at 20:09

## 2020-08-17 RX ADMIN — LORAZEPAM SCH MG: 1 TABLET ORAL at 20:09

## 2020-08-17 RX ADMIN — OXYCODONE HYDROCHLORIDE SCH MG: 20 TABLET, FILM COATED, EXTENDED RELEASE ORAL at 09:15

## 2020-08-17 NOTE — GENERAL PROGRESS NOTE
Assessment/Plan


Problem List:  


(1) Toxic metabolic encephalopathy


ICD Codes:  G92 - Toxic encephalopathy


SNOMED:  993083057


(2) Folate deficiency


ICD Codes:  E53.8 - Deficiency of other specified B group vitamins


SNOMED:  002197457


(3) Ataxia


ICD Codes:  R27.0 - Ataxia, unspecified


SNOMED:  85829497


(4) Head injury


ICD Codes:  S09.90XA - Unspecified injury of head, initial encounter


SNOMED:  57891703


(5) Laceration of head


ICD Codes:  S01.91XA - Laceration without foreign body of unspecified part of 

head, initial encounter


SNOMED:  140253480


(6) Cervical spine disease


ICD Codes:  M48.9 - Spondylopathy, unspecified


SNOMED:  345932880


(7) Weakness


ICD Codes:  R53.1 - Weakness


SNOMED:  22025820


Status:  doing well, progressing


Assessment/Plan:





 59 year old man with prior substance abuse, previous stroke, anxiety who comes 

in with recurrent falls and ataxia





#Acute metabolic encephalopathy


#Folate deficiency


#Recurrent falls


#Ataxia


#Encephalomalacia on brain MRI


#Evidence of chronic stroke on brain MRI


#history of cervical spine fusion


#history of substance abuse


#Anxiety


-cont inaptient level of care


-spoke with PCP, Dr. MARGIE Hess


-Continue folic acid 1 mg PO daily - now normal folate level


-continue gabapentin


-Neurology following


-Cardiology consulted per PCPs request, Dr. Shaver - fidencio


-Strict fall precautions


-PT/OT eval noted, referred to [facility] but rejected for pervious drug 

use. Now going to [facility] rehab


-VTE PPx HSQ





I spent 38 minutes on this patient with 25 minutes with counseling and care 

coordination. I reviewed images and labs. Discussed with consultants, RNs, Case 

manager and pharmacy.





Subjective


Date patient seen:  Aug 17, 2020


Time patient seen:  09:00


ROS Limited/Unobtainable:  No


Constitutional:  Reports: weakness; Denies: diaphoresis, fever, malaise


HEENT:  Denies: eye pain, blurred vision, tearing, double vision


Cardiovascular:  Denies: chest pain, irregular heart rate, lightheadedness


Respiratory:  Denies: cough, orthopnea, shortness of breath, SOB with excertion


Gastrointestinal/Abdominal:  Denies: abdomen distended, abdominal pain, black 

stools, tarry stools


Genitourinary:  Denies: burning, discharge, frequency, flank pain


Neurologic/Psychiatric:  Reports: weakness; Denies: anxiety, depressed, headache

, seizure, tingling


Endocrine:  Denies: excessive sweating, flushing, intolerance to cold, 

intolerance to heat, increased hunger


Hematologic/Lymphatic:  Denies: anemia, easy bleeding, easy bruising


Allergies:  


Coded Allergies:  


     AMOXICILLIN (Verified  Allergy, Unknown, 4/4/09)


     PENICILLINS (Verified  Allergy, Unknown, Rash, 8/13/20)


     MEPERIDINE (Verified  Adverse Reaction, Mild, NAUSEA, 5/4/11)


     MORPHINE (Verified  Adverse Reaction, Mild, NAUSEA, 5/4/11)


Uncoded Allergies:  


     PENICILIN (Allergy, Unknown, 8/13/20)


All Systems:  reviewed and negative except above


Subjective


Patient feels better. Upset that some of his pain meds were changed. Feels he 

is improving.





Objective





Last 24 Hour Vital Signs








  Date Time  Temp Pulse Resp B/P (MAP) Pulse Ox O2 Delivery O2 Flow Rate FiO2


 


8/17/20 16:09 97.7 113 20 115/73 (87) 92   


 


8/17/20 12:00 98.4 106 20 121/79 (93)    


 


8/17/20 09:00      Room Air  


 


8/17/20 08:00 98.0 109 20 118/83 (95) 95   


 


8/17/20 04:00 97.7 103 18 124/83 (97) 96   


 


8/17/20 00:00 98.0 99 18 131/73 (92) 97   


 


8/16/20 21:00      Room Air  


 


8/16/20 20:00 98.6 110 19 120/67 (84) 97   

















Intake and Output  


 


 8/16/20 8/17/20





 19:00 07:00


 


Intake Total 500 ml 


 


Output Total 0 ml 


 


Balance 500 ml 


 


  


 


Intake Oral 500 ml 


 


Output Urine Total 0 ml 


 


# Voids 3 1


 


# Bowel Movements 3 1








Laboratory Tests


8/17/20 05:40: 


White Blood Count 6.3, Red Blood Count 4.71, Hemoglobin 14.5, Hematocrit 43.7, 

Mean Corpuscular Volume 93, Mean Corpuscular Hemoglobin 30.7, Mean Corpuscular 

Hemoglobin Concent 33.1, Red Cell Distribution Width 11.9, Platelet Count 215, 

Mean Platelet Volume 7.6, Neutrophils (%) (Auto) 50.1, Lymphocytes (%) (Auto) 

36.9, Monocytes (%) (Auto) 7.7, Eosinophils (%) (Auto) 3.8H, Basophils (%) (Auto

) 1.5, Sodium Level 139, Potassium Level 3.7, Chloride Level 101, Carbon 

Dioxide Level 27, Anion Gap 11, Blood Urea Nitrogen 16, Creatinine 1.2, Estimat 

Glomerular Filtration Rate > 60, Glucose Level 110H, Calcium Level 9.1, Vitamin 

B12 Level 1991H, Folate 15.5


Height (Feet):  5


Height (Inches):  10.00


Weight (Pounds):  200


General Appearance:  no apparent distress, alert, overweight


EENT:  PERRL/EOMI, normal ENT inspection, TMs normal


Neck:  non-tender, normal alignment, supple


Cardiovascular:  normal peripheral pulses, normal rate, regular rhythm, no 

gallop/murmur, no JVD


Respiratory/Chest:  chest wall non-tender, lungs clear, normal breath sounds, 

no respiratory distress, no accessory muscle use


Abdomen:  normal bowel sounds, non tender, soft, no organomegaly, no mass


Extremities:  normal range of motion, non-tender, normal inspection


Edema:  no edema noted Arm (L), no edema noted Arm (R), no edema noted Leg (L), 

no edema noted Leg (R), no edema noted Pedal (L), no edema noted Pedal (R), no 

edema noted Generalized


Neurologic:  CNs II-XII grossly normal, abnormal gait, oriented x 3, responsive

, normal mood/affect


Skin:  normal pigmentation, warm/dry











Cyrus Ibanez M.D. Aug 17, 2020 16:55

## 2020-08-17 NOTE — NEUROLOGY PROGRESS NOTE
Interim History


Mr. Sven Moore is a 59-year-old, right-handed, 


 gentleman, who does have a past history of 


a cervical spine injury for which he has had surgery, 


a severe head injury the exact details of which he cannot 


tell me about, anxiety, polysubstance abuse, and 


unsteadiness on his feet.





For the last 3 weeks or so he has been increasingly 


unsteady on his feet, has fallen down on numerous 


occasions, and has been more confused and altered 


with regards to his mental state. He himself is unable 


to give me much of a history.





He feels a little better today.





He feels that the mind is clear but still not normal.





The abnormal body movements have not been bothersome.





He feels stronger.





He denies any new neurological symptoms.





Review of Systems


Neuro Review of Systems


Benign.





Objective


Physical Exam





Last Vital Signs








  Date Time  Temp Pulse Resp B/P (MAP) Pulse Ox O2 Delivery O2 Flow Rate FiO2


 


8/17/20 09:00      Room Air  


 


8/17/20 08:00 98.0 109 20 118/83 (95) 95   


 


8/13/20 15:00        98











Laboratory Tests








Test


  8/17/20


05:40


 


White Blood Count


  6.3 K/UL


(4.8-10.8)


 


Red Blood Count


  4.71 M/UL


(4.70-6.10)


 


Hemoglobin


  14.5 G/DL


(14.2-18.0)


 


Hematocrit


  43.7 %


(42.0-52.0)


 


Mean Corpuscular Volume 93 FL (80-99)  


 


Mean Corpuscular Hemoglobin


  30.7 PG


(27.0-31.0)


 


Mean Corpuscular Hemoglobin


Concent 33.1 G/DL


(32.0-36.0)


 


Red Cell Distribution Width


  11.9 %


(11.6-14.8)


 


Platelet Count


  215 K/UL


(150-450)


 


Mean Platelet Volume


  7.6 FL


(6.5-10.1)


 


Neutrophils (%) (Auto)


  50.1 %


(45.0-75.0)


 


Lymphocytes (%) (Auto)


  36.9 %


(20.0-45.0)


 


Monocytes (%) (Auto)


  7.7 %


(1.0-10.0)


 


Eosinophils (%) (Auto)


  3.8 %


(0.0-3.0)  H


 


Basophils (%) (Auto)


  1.5 %


(0.0-2.0)


 


Sodium Level


  139 MMOL/L


(136-145)


 


Potassium Level


  3.7 MMOL/L


(3.5-5.1)


 


Chloride Level


  101 MMOL/L


()


 


Carbon Dioxide Level


  27 MMOL/L


(21-32)


 


Anion Gap


  11 mmol/L


(5-15)


 


Blood Urea Nitrogen


  16 mg/dL


(7-18)


 


Creatinine


  1.2 MG/DL


(0.55-1.30)


 


Estimat Glomerular Filtration


Rate > 60 mL/min


(>60)


 


Glucose Level


  110 MG/DL


()  H


 


Calcium Level


  9.1 MG/DL


(8.5-10.1)


 


Vitamin B12 Level


  1991 PG/ML


(193-986)  H


 


Folate


  15.5 NG/ML


(8.6-58.9)











Neurologic Exam


Objective


PHYSICAL EXAMINATION:


GENERAL: He is a well-developed, relatively well-nourished, 


 gentleman, lying in bed, in no acute distress. 


HEAD: Normocephalic and atraumatic. 


NECK: No neck rigidity was observed. 


Range of motion was decreased in the neck.


EENT: Benign. 





NEUROLOGIC EXAMINATION:  


MENTAL STATUS EXAMINATION: 


He was awake but and alert.


He was oriented to [facility], [employer] [place] and [facility] only.  


He was able to recall 3/3 words immediately but could 


only remember 1/3 after 1 minute and 3 minutes.


He was unable to remember the names of the present 


[place] president and prior [place] presidents.


His mathematical skills were impaired.


His visuospatial function was also impaired.


SPEECH: He had no dysarthria.


LANGUAGE: He was able to comprehend and express 


himself relatively well.


CRANIAL NERVE EXAMINATION:  


II: The visual fields were intact on confrontation testing.


III, IV, VI: External ocular movements were full and pupils 


3 mm in diameter equal, round, regular and reactive to light.  


V:  The facial sensations were normal, and the temporales, 


masseters and pterygoids functioned normally. 


VII: He had a mild left greater than right seventh central 


facial paresis.


VIII: The patient was able to hear well bilaterally and had 


no nystagmus. 


IX: The palate moved symmetrically on phonation. 


X:  There was no hoarseness of voice.  


XI:  The sternocleidomastoids and trapezii functioned normally.  


XII:  The tongue was in the midline without any fasciculations 


or atrophy.


MOTOR SYSTEM:  


The tone was increased in both lower extremities with mild 


degree of spasticity.


Examination of muscle mass revealed no focal wasting. 


Examination of power revealed G 5/5 power in all muscle 


groups tested.


SENSORY EXAMINATION: He was able to localize light 


touch all over his body.


REFLEXES:  1+ and bilaterally symmetric at the biceps, 


triceps, brachioradialis, and knees. 0 at both ankles.  


The plantar responses were flexor bilaterally.  


COORDINATION: He performed well on finger-to-nose


testing bilaterally.


STANCE & GAIT: Could not be tested.


ABNORMAL MOVEMENTS:


Asterixis: G Trace/4 in both upper extremities.


Myoclonus: G 0/4





Impression/Recommendations


Diagnostic Impression


DIAGNOSTIC IMPRESSION:


1. Mr. Sven Moore is a 59-year-old, right-handed, 


 gentleman, who does have a past history of 


a cervical spine injury for which he has had surgery, 


a severe head injury the exact details of which he cannot 


tell me about, anxiety, polysubstance abuse, and 


unsteadiness on his feet.


2. For the last 3 weeks or so he has been increasingly 


unsteady on his feet, has fallen down on numerous 


occasions, and has been more confused and altered 


with regards to his mental state. He himself is unable 


to give me much of a history.


3. He feels a little better today. He feels that the mind 


is clear but still not normal. The abnormal body movements 


have not been bothersome. He feels stronger. He denies 


any new neurological symptoms.


4.  On neurological examination, at this time, he is 


awake and more alert. He is oriented to [employer] in the year 2020.  His memory is impaired.  


His visuospatial function and higher cognitive function 


is also impaired. He continues to have language 


problems. He has a left greater than right seventh 


central facial paresis.  The strength in all his extremities 


is stable. His deep tendon reflexes are globally diminished.  


He exhibits minimal asterixis but the myoclonus has resolved.


5.  The MRI scan of the brain reveals bilateral frontal and 


right temporal encephalomalacia in a traumatic pattern.


6.  The MRI scan of the cervical spine reveals postsurgical 


changes with mild to moderate multilevel disease but no 


cord compression.


7.  Laboratory data on my initial evaluation revealed a mild 


anemia with a hemoglobin of 12.4 g, elevated alkaline 


phosphatase 840, low albumin at 3.3, low vitamin B12 


level at 304, low folate at 6 and normal TSH.


8.  Further laboratory tests have revealed an elevated 


ammonia at 43.  The arterial blood gas revealed a normal 


pH at 7.36 with an elevated PCO2 at 47.5 and a low 


PO2 at 63.7.


9. The EEG reveals a moderate metabolic encephalopathy.


10.  The patient's history and neurological examination are 


most consistent with an encephalopathic picture.  The 


encephalopathy is of a metabolic nature most probably 


related to the hyperammonemia, hypoxemia, and hypercarbia.


11.  The patient is less encephalopathic today.


12.  The frequent falls may be associated with the 


encephalopathy and asterixis.


Recommendations


RECOMMENDATIONS:


1.  Continue present management.


2.  Correct all toxic metabolic imbalances including reversal 


of folate and B12 deficiency, correction of hypoxemia and


hypercarbia, and hyperammonemia.


3.  Increase activity as tolerated.


4.  Mobilize with physical therapy.


5.  Observe closely.











__________________________________


Tio Martin M.D., M.S.P.H.


Neurologist & Clinical Neurophysiologist











Tio Martin MD Aug 17, 2020 11:18

## 2020-08-17 NOTE — DISCHARGE SUMMARY
Discharge Summary


Hospital Course


Date of Admission


Aug 13, 2020 at 15:50


Date of Discharge


8/18/20


Admitting Diagnosis


gen weakness, head injury


HPI


59 year old man, remote history of alcohol abuse, methamphetamine use, CVA, 

previous cervical spine fusion, anxiety who presented to the ED with frequent 

falls, ataxia and bilateral leg weakness. No incontinence, focal weakness, no 

visual deficits. In ED imaging including MRI brain and C-spine were done, no 

acute abnormality found. Brain did show encephalomalacia.


Consultations


Neurology Dr. Martin


Procedures


EEG which was abnormal


Hospital Course


59 year old man with prior substance abuse, previous stroke, anxiety who comes 

in with recurrent falls and ataxia for about 2 weeks. Pateint had MRI of brain 

which showed Chronic and age-related changes, including old infarcts. Negative 

for acute intracranial bleed or mass effect. Cervical spine MRI had too much 

artifact from hardware. Hip XR showed 1.Bilateral total hip arthroplasties 

without complication.2.  Severe osteopenia. 3.  If there is further concern for 

occult fracture, consider additional imaging. CT of the head showed 

encephalomalacia. Labs were remarkable for folate difecicney which was 

corrected. Evaluated by neurology Dr. Martin and diagnosed with metabolic 

encephalopathy  due to folate and B12 deficiency which was repleted. Patient 

worked with PT which recommended SNF at this time. COVID 19 negative. Discussed 

ED precautions with patient and reviewed his medications which i counseled to 

discus with PCP for tapering down narcotics since he may be falling due to his 

many pain meds. 





Patient has appointment with Dr. Hess this Thursday, August 20, 2020 at 0900 

which he cannot miss.








Discharge Medications


Continued Medications:  


Gabapentin (Neurontin) 300 Mg Cap


300 MG ORAL THREE TIMES A DAY, CAP





Ibuprofen* (Motrin*) 600 Mg Tablet


600 MG ORAL Q6H PRN for For Pain, #30 TAB 0 Refills





Lorazepam* (Ativan*) 2 Mg Tablet


2 MG ORAL BEDTIME





Multivitamin With Minerals (Multivitamins With Minerals*) 1 Each Tablet


1 TAB ORAL DAILY, TAB





Oxycodone Hcl Er* (Oxycontin*) 80 Mg Tab.er.12h


80 MG ORAL TID, TAB





Quetiapine Fumarate* (Seroquel*) 200 Mg Tablet


200 MG ORAL DAILY, TAB











Discharge


Condition Upon Discharge:  stable


Discharge Vital Signs





Last Vital Signs








  Date Time  Temp Pulse Resp B/P (MAP) Pulse Ox O2 Delivery O2 Flow Rate FiO2


 


8/17/20 12:00 98.4 106 20 121/79 (93)    


 


8/17/20 09:00      Room Air  


 


8/17/20 08:00     95   


 


8/13/20 15:00        98








Discharge Disposition


Patient was discharged to [facility]


Discharge Diagnoses:  


(1) Head injury


(2) Ataxia


(3) Laceration of head


(4) Cervical spine disease


(5) Weakness


(6) Toxic metabolic encephalopathy


(7) Folate deficiency


(8) IgG deficiency











Cyrus Ibanez M.D. Aug 17, 2020 16:10

## 2020-08-18 VITALS — DIASTOLIC BLOOD PRESSURE: 78 MMHG | SYSTOLIC BLOOD PRESSURE: 149 MMHG

## 2020-08-18 VITALS — DIASTOLIC BLOOD PRESSURE: 94 MMHG | SYSTOLIC BLOOD PRESSURE: 120 MMHG

## 2020-08-18 VITALS — DIASTOLIC BLOOD PRESSURE: 87 MMHG | SYSTOLIC BLOOD PRESSURE: 132 MMHG

## 2020-08-18 VITALS — SYSTOLIC BLOOD PRESSURE: 129 MMHG | DIASTOLIC BLOOD PRESSURE: 90 MMHG

## 2020-08-18 RX ADMIN — Medication SCH EA: at 08:37

## 2020-08-18 RX ADMIN — OXYCODONE HYDROCHLORIDE SCH MG: 20 TABLET, FILM COATED, EXTENDED RELEASE ORAL at 00:46

## 2020-08-18 RX ADMIN — QUETIAPINE SCH MG: 200 TABLET, FILM COATED ORAL at 08:37

## 2020-08-18 RX ADMIN — OXYCODONE HYDROCHLORIDE SCH MG: 20 TABLET, FILM COATED, EXTENDED RELEASE ORAL at 08:37

## 2020-08-18 RX ADMIN — DOCUSATE SODIUM SCH MG: 100 CAPSULE, LIQUID FILLED ORAL at 08:43

## 2020-08-18 RX ADMIN — HEPARIN SODIUM SCH UNITS: 5000 INJECTION INTRAVENOUS; SUBCUTANEOUS at 08:38

## 2020-08-18 NOTE — DISCHARGE INSTRUCTIONS
Discharge Instructions


Discharge Instructions


Follow up with:  Dr. Hess  8/20/20


Call MD/Return to Hospital if:  If you fall, headache, fainting, confusion or 

sucidal.


Services at Discharge:  physical therapy


Diet:  cardiac 2 GM Na, low fat


Activity:  ambulate w/ assist only





For Congestive Heart Failure


Reminder


Report to your physician any weight gain of 5 pounds or more in one week.











Cyrus Ibanez M.D. Aug 18, 2020 10:27

## 2023-11-21 NOTE — DIAGNOSTIC IMAGING REPORT
EXAM:

  XR Bilateral Hips With Pelvis When Performed, 2 or 3 Views

 

CLINICAL HISTORY:

  FALL

 

TECHNIQUE:

  Three or four views of the bilateral hips with pelvis when performed.

 

COMPARISON:

  No relevant prior studies available.

 

FINDINGS:

  Bones/joints:  Bilateral total hip arthroplasties without complication. 

 Severe osteopenia.  If there is further concern for occult fracture, 

consider additional imaging.  No dislocation.

  Soft tissues:  Unremarkable.

 

IMPRESSION:     

1.  Bilateral total hip arthroplasties without complication.

2.  Severe osteopenia.

3.  If there is further concern for occult fracture, consider additional 

imaging. Minoxidil Counseling: Minoxidil is a topical medication which can increase blood flow where it is applied. It is uncertain how this medication increases hair growth. Side effects are uncommon and include stinging and allergic reactions.